# Patient Record
Sex: FEMALE | Race: WHITE | NOT HISPANIC OR LATINO | Employment: FULL TIME | URBAN - METROPOLITAN AREA
[De-identification: names, ages, dates, MRNs, and addresses within clinical notes are randomized per-mention and may not be internally consistent; named-entity substitution may affect disease eponyms.]

---

## 2023-09-11 PROBLEM — H40.001 PREGLAUCOMA, UNSPECIFIED, RIGHT EYE: Status: ACTIVE | Noted: 2023-09-11

## 2023-09-11 PROBLEM — E11.9 ABNORMAL METABOLIC STATE DUE TO DIABETES MELLITUS (MULTI): Status: ACTIVE | Noted: 2023-09-11

## 2023-09-11 PROBLEM — F41.9 ANXIETY AND DEPRESSION: Status: ACTIVE | Noted: 2023-09-11

## 2023-09-11 PROBLEM — H11.31 CONJUNCTIVAL HEMORRHAGE OF RIGHT EYE: Status: ACTIVE | Noted: 2023-09-11

## 2023-09-11 PROBLEM — E78.5 HYPERLIPIDEMIA: Status: ACTIVE | Noted: 2023-09-11

## 2023-09-11 PROBLEM — E11.9 DIABETES MELLITUS (MULTI): Status: ACTIVE | Noted: 2023-09-11

## 2023-09-11 PROBLEM — H43.399 VITREOUS OPACITIES: Status: ACTIVE | Noted: 2023-09-11

## 2023-09-11 PROBLEM — G47.00 INSOMNIA: Status: ACTIVE | Noted: 2023-09-11

## 2023-09-11 PROBLEM — I11.9 BENIGN HYPERTENSIVE HEART DISEASE: Status: ACTIVE | Noted: 2023-09-11

## 2023-09-11 PROBLEM — L40.9 PSORIASIS: Status: ACTIVE | Noted: 2023-09-11

## 2023-09-11 PROBLEM — I10 HYPERTENSION: Status: ACTIVE | Noted: 2023-09-11

## 2023-09-11 PROBLEM — E03.9 HYPOTHYROIDISM: Status: ACTIVE | Noted: 2023-09-11

## 2023-09-11 PROBLEM — H25.10 NUCLEAR SENILE CATARACT: Status: ACTIVE | Noted: 2023-09-11

## 2023-09-11 PROBLEM — H04.129 TEAR FILM INSUFFICIENCY: Status: ACTIVE | Noted: 2023-09-11

## 2023-09-11 PROBLEM — R93.1 ABNORMAL HEART SCORE CT: Status: ACTIVE | Noted: 2023-09-11

## 2023-09-11 PROBLEM — E11.9 TYPE 2 DIABETES MELLITUS WITHOUT COMPLICATION (MULTI): Status: ACTIVE | Noted: 2023-09-11

## 2023-09-11 PROBLEM — E66.01 MORBID OBESITY (MULTI): Status: ACTIVE | Noted: 2023-09-11

## 2023-09-11 PROBLEM — H35.039 HYPERTENSIVE RETINOPATHY: Status: ACTIVE | Noted: 2023-09-11

## 2023-09-11 PROBLEM — F32.A ANXIETY AND DEPRESSION: Status: ACTIVE | Noted: 2023-09-11

## 2023-09-11 PROBLEM — H44.20 DEGENERATIVE PROGRESSIVE HIGH MYOPIA: Status: ACTIVE | Noted: 2023-09-11

## 2023-09-11 RX ORDER — FENOFIBRATE 145 MG/1
1 TABLET, FILM COATED ORAL DAILY
COMMUNITY
Start: 2021-04-02

## 2023-09-11 RX ORDER — ROSUVASTATIN CALCIUM 10 MG/1
TABLET, COATED ORAL
COMMUNITY
Start: 2021-03-01

## 2023-09-11 RX ORDER — LEVOTHYROXINE SODIUM 112 UG/1
112 TABLET ORAL
COMMUNITY
Start: 2021-03-01

## 2023-09-11 RX ORDER — VENLAFAXINE HYDROCHLORIDE 75 MG/1
CAPSULE, EXTENDED RELEASE ORAL
COMMUNITY
End: 2023-12-14 | Stop reason: WASHOUT

## 2023-09-11 RX ORDER — VENLAFAXINE HYDROCHLORIDE 37.5 MG/1
CAPSULE, EXTENDED RELEASE ORAL
COMMUNITY
Start: 2021-03-01

## 2023-09-11 RX ORDER — TRAMADOL HYDROCHLORIDE 50 MG/1
TABLET ORAL
COMMUNITY
Start: 2020-08-31

## 2023-09-11 RX ORDER — CALCIUM CARBONATE 500(1250)
TABLET ORAL
COMMUNITY
End: 2023-12-14 | Stop reason: WASHOUT

## 2023-09-11 RX ORDER — AMLODIPINE BESYLATE 10 MG/1
1 TABLET ORAL DAILY
COMMUNITY
Start: 2021-06-25

## 2023-09-11 RX ORDER — ASPIRIN 81 MG/1
1 TABLET ORAL DAILY
COMMUNITY
Start: 2022-04-08 | End: 2024-01-25

## 2023-09-11 RX ORDER — CHOLECALCIFEROL (VITAMIN D3) 50 MCG
TABLET ORAL
COMMUNITY

## 2023-09-11 RX ORDER — TRAZODONE HYDROCHLORIDE 50 MG/1
TABLET ORAL
COMMUNITY
Start: 2021-08-04

## 2023-09-11 RX ORDER — DAPAGLIFLOZIN 10 MG/1
1 TABLET, FILM COATED ORAL DAILY
COMMUNITY
Start: 2021-07-20

## 2023-09-11 RX ORDER — METOPROLOL SUCCINATE 200 MG/1
1 TABLET, EXTENDED RELEASE ORAL DAILY
COMMUNITY
Start: 2021-03-01 | End: 2023-12-14 | Stop reason: WASHOUT

## 2023-09-11 RX ORDER — UBIDECARENONE 75 MG
1 CAPSULE ORAL DAILY
COMMUNITY
Start: 2022-04-08 | End: 2024-01-25

## 2023-09-11 RX ORDER — LOSARTAN POTASSIUM AND HYDROCHLOROTHIAZIDE 25; 100 MG/1; MG/1
1 TABLET ORAL DAILY
COMMUNITY
Start: 2021-08-30 | End: 2024-01-25 | Stop reason: DRUGHIGH

## 2023-09-11 RX ORDER — METFORMIN HYDROCHLORIDE 500 MG/1
2 TABLET, EXTENDED RELEASE ORAL 2 TIMES DAILY
COMMUNITY
Start: 2021-07-26

## 2023-09-11 RX ORDER — FENOFIBRATE 160 MG/1
TABLET ORAL
COMMUNITY
End: 2023-12-14 | Stop reason: WASHOUT

## 2023-09-11 RX ORDER — NAPROXEN SODIUM 220 MG/1
TABLET ORAL
COMMUNITY
End: 2023-12-14 | Stop reason: WASHOUT

## 2023-09-11 RX ORDER — LISINOPRIL 10 MG/1
TABLET ORAL
COMMUNITY
End: 2023-12-14 | Stop reason: WASHOUT

## 2023-09-11 RX ORDER — LIRAGLUTIDE 6 MG/ML
INJECTION SUBCUTANEOUS
COMMUNITY
Start: 2021-06-23 | End: 2023-12-14 | Stop reason: WASHOUT

## 2023-12-14 ENCOUNTER — OFFICE VISIT (OUTPATIENT)
Dept: RHEUMATOLOGY | Facility: CLINIC | Age: 63
End: 2023-12-14
Payer: MEDICAID

## 2023-12-14 ENCOUNTER — LAB (OUTPATIENT)
Dept: LAB | Facility: LAB | Age: 63
End: 2023-12-14
Payer: MEDICAID

## 2023-12-14 ENCOUNTER — ANCILLARY PROCEDURE (OUTPATIENT)
Dept: RADIOLOGY | Facility: CLINIC | Age: 63
End: 2023-12-14
Payer: MEDICAID

## 2023-12-14 VITALS
DIASTOLIC BLOOD PRESSURE: 72 MMHG | HEART RATE: 81 BPM | SYSTOLIC BLOOD PRESSURE: 126 MMHG | BODY MASS INDEX: 28.03 KG/M2 | OXYGEN SATURATION: 96 % | HEIGHT: 65 IN | WEIGHT: 168.21 LBS

## 2023-12-14 DIAGNOSIS — L40.0 PLAQUE PSORIASIS: ICD-10-CM

## 2023-12-14 DIAGNOSIS — R76.8 POSITIVE ANA (ANTINUCLEAR ANTIBODY): ICD-10-CM

## 2023-12-14 DIAGNOSIS — Z11.59 ENCOUNTER FOR SCREENING FOR OTHER VIRAL DISEASES: ICD-10-CM

## 2023-12-14 DIAGNOSIS — E55.9 VITAMIN D DEFICIENCY: ICD-10-CM

## 2023-12-14 DIAGNOSIS — M10.9 GOUT WITH MANIFESTATIONS: ICD-10-CM

## 2023-12-14 DIAGNOSIS — M05.80 POLYARTHRITIS WITH POSITIVE RHEUMATOID FACTOR (MULTI): ICD-10-CM

## 2023-12-14 DIAGNOSIS — M05.80 POLYARTHRITIS WITH POSITIVE RHEUMATOID FACTOR (MULTI): Primary | ICD-10-CM

## 2023-12-14 DIAGNOSIS — Z78.0 POST-MENOPAUSAL: ICD-10-CM

## 2023-12-14 LAB
ALBUMIN SERPL BCP-MCNC: 4.5 G/DL (ref 3.4–5)
ALP SERPL-CCNC: 49 U/L (ref 33–136)
ALT SERPL W P-5'-P-CCNC: 22 U/L (ref 7–45)
ANION GAP SERPL CALC-SCNC: 15 MMOL/L (ref 10–20)
APPEARANCE UR: CLEAR
AST SERPL W P-5'-P-CCNC: 26 U/L (ref 9–39)
BASOPHILS # BLD AUTO: 0.08 X10*3/UL (ref 0–0.1)
BASOPHILS NFR BLD AUTO: 0.6 %
BILIRUB SERPL-MCNC: 0.2 MG/DL (ref 0–1.2)
BILIRUB UR STRIP.AUTO-MCNC: NEGATIVE MG/DL
BUN SERPL-MCNC: 20 MG/DL (ref 6–23)
CALCIUM SERPL-MCNC: 10.1 MG/DL (ref 8.6–10.3)
CHLORIDE SERPL-SCNC: 97 MMOL/L (ref 98–107)
CK SERPL-CCNC: 49 U/L (ref 0–215)
CO2 SERPL-SCNC: 28 MMOL/L (ref 21–32)
COLOR UR: YELLOW
CREAT SERPL-MCNC: 0.71 MG/DL (ref 0.5–1.05)
CRP SERPL-MCNC: 2.02 MG/DL
EOSINOPHIL # BLD AUTO: 0.75 X10*3/UL (ref 0–0.7)
EOSINOPHIL NFR BLD AUTO: 5.4 %
ERYTHROCYTE [DISTWIDTH] IN BLOOD BY AUTOMATED COUNT: 14.3 % (ref 11.5–14.5)
ERYTHROCYTE [SEDIMENTATION RATE] IN BLOOD BY WESTERGREN METHOD: 30 MM/H (ref 0–30)
FERRITIN SERPL-MCNC: 41 NG/ML (ref 8–150)
GFR SERPL CREATININE-BSD FRML MDRD: >90 ML/MIN/1.73M*2
GLUCOSE SERPL-MCNC: 104 MG/DL (ref 74–99)
GLUCOSE UR STRIP.AUTO-MCNC: ABNORMAL MG/DL
HCT VFR BLD AUTO: 41.8 % (ref 36–46)
HGB BLD-MCNC: 12.8 G/DL (ref 12–16)
HOLD SPECIMEN: NORMAL
IMM GRANULOCYTES # BLD AUTO: 0.05 X10*3/UL (ref 0–0.7)
IMM GRANULOCYTES NFR BLD AUTO: 0.4 % (ref 0–0.9)
IRON SATN MFR SERPL: 8 % (ref 25–45)
IRON SERPL-MCNC: 40 UG/DL (ref 35–150)
KETONES UR STRIP.AUTO-MCNC: NEGATIVE MG/DL
LEUKOCYTE ESTERASE UR QL STRIP.AUTO: NEGATIVE
LYMPHOCYTES # BLD AUTO: 4.22 X10*3/UL (ref 1.2–4.8)
LYMPHOCYTES NFR BLD AUTO: 30.5 %
MCH RBC QN AUTO: 26.6 PG (ref 26–34)
MCHC RBC AUTO-ENTMCNC: 30.6 G/DL (ref 32–36)
MCV RBC AUTO: 87 FL (ref 80–100)
MONOCYTES # BLD AUTO: 0.98 X10*3/UL (ref 0.1–1)
MONOCYTES NFR BLD AUTO: 7.1 %
MUCOUS THREADS #/AREA URNS AUTO: NORMAL /LPF
NEUTROPHILS # BLD AUTO: 7.74 X10*3/UL (ref 1.2–7.7)
NEUTROPHILS NFR BLD AUTO: 56 %
NITRITE UR QL STRIP.AUTO: NEGATIVE
NRBC BLD-RTO: 0 /100 WBCS (ref 0–0)
PH UR STRIP.AUTO: 6 [PH]
PLATELET # BLD AUTO: 501 X10*3/UL (ref 150–450)
POTASSIUM SERPL-SCNC: 3.6 MMOL/L (ref 3.5–5.3)
PROT SERPL-MCNC: 8.3 G/DL (ref 6.4–8.2)
PROT UR STRIP.AUTO-MCNC: NEGATIVE MG/DL
RBC # BLD AUTO: 4.82 X10*6/UL (ref 4–5.2)
RBC # UR STRIP.AUTO: ABNORMAL /UL
RBC #/AREA URNS AUTO: NORMAL /HPF
SODIUM SERPL-SCNC: 136 MMOL/L (ref 136–145)
SP GR UR STRIP.AUTO: 1.02
TIBC SERPL-MCNC: 476 UG/DL (ref 240–445)
UIBC SERPL-MCNC: 436 UG/DL (ref 110–370)
URATE SERPL-MCNC: 5.5 MG/DL (ref 2.3–6.7)
UROBILINOGEN UR STRIP.AUTO-MCNC: <2 MG/DL
WBC # BLD AUTO: 13.8 X10*3/UL (ref 4.4–11.3)
WBC #/AREA URNS AUTO: NORMAL /HPF

## 2023-12-14 PROCEDURE — 85613 RUSSELL VIPER VENOM DILUTED: CPT

## 2023-12-14 PROCEDURE — 82550 ASSAY OF CK (CPK): CPT

## 2023-12-14 PROCEDURE — 83550 IRON BINDING TEST: CPT

## 2023-12-14 PROCEDURE — 73130 X-RAY EXAM OF HAND: CPT | Mod: BILATERAL PROCEDURE | Performed by: RADIOLOGY

## 2023-12-14 PROCEDURE — 86160 COMPLEMENT ANTIGEN: CPT

## 2023-12-14 PROCEDURE — 84550 ASSAY OF BLOOD/URIC ACID: CPT

## 2023-12-14 PROCEDURE — 86803 HEPATITIS C AB TEST: CPT

## 2023-12-14 PROCEDURE — 86140 C-REACTIVE PROTEIN: CPT

## 2023-12-14 PROCEDURE — 85730 THROMBOPLASTIN TIME PARTIAL: CPT

## 2023-12-14 PROCEDURE — 86146 BETA-2 GLYCOPROTEIN ANTIBODY: CPT

## 2023-12-14 PROCEDURE — 36415 COLL VENOUS BLD VENIPUNCTURE: CPT

## 2023-12-14 PROCEDURE — 86481 TB AG RESPONSE T-CELL SUSP: CPT

## 2023-12-14 PROCEDURE — 86225 DNA ANTIBODY NATIVE: CPT

## 2023-12-14 PROCEDURE — 83540 ASSAY OF IRON: CPT

## 2023-12-14 PROCEDURE — 85652 RBC SED RATE AUTOMATED: CPT

## 2023-12-14 PROCEDURE — 73630 X-RAY EXAM OF FOOT: CPT | Mod: 50

## 2023-12-14 PROCEDURE — 82306 VITAMIN D 25 HYDROXY: CPT

## 2023-12-14 PROCEDURE — 85025 COMPLETE CBC W/AUTO DIFF WBC: CPT

## 2023-12-14 PROCEDURE — 86376 MICROSOMAL ANTIBODY EACH: CPT

## 2023-12-14 PROCEDURE — 86618 LYME DISEASE ANTIBODY: CPT

## 2023-12-14 PROCEDURE — 3074F SYST BP LT 130 MM HG: CPT | Performed by: INTERNAL MEDICINE

## 2023-12-14 PROCEDURE — 80053 COMPREHEN METABOLIC PANEL: CPT

## 2023-12-14 PROCEDURE — 99205 OFFICE O/P NEW HI 60 MIN: CPT | Performed by: INTERNAL MEDICINE

## 2023-12-14 PROCEDURE — 1036F TOBACCO NON-USER: CPT | Performed by: INTERNAL MEDICINE

## 2023-12-14 PROCEDURE — 99215 OFFICE O/P EST HI 40 MIN: CPT | Performed by: INTERNAL MEDICINE

## 2023-12-14 PROCEDURE — 83516 IMMUNOASSAY NONANTIBODY: CPT

## 2023-12-14 PROCEDURE — 82652 VIT D 1 25-DIHYDROXY: CPT

## 2023-12-14 PROCEDURE — 87340 HEPATITIS B SURFACE AG IA: CPT

## 2023-12-14 PROCEDURE — 86200 CCP ANTIBODY: CPT

## 2023-12-14 PROCEDURE — 83655 ASSAY OF LEAD: CPT

## 2023-12-14 PROCEDURE — 82728 ASSAY OF FERRITIN: CPT

## 2023-12-14 PROCEDURE — 87389 HIV-1 AG W/HIV-1&-2 AB AG IA: CPT

## 2023-12-14 PROCEDURE — 73130 X-RAY EXAM OF HAND: CPT | Mod: 50

## 2023-12-14 PROCEDURE — 86147 CARDIOLIPIN ANTIBODY EA IG: CPT

## 2023-12-14 PROCEDURE — 82784 ASSAY IGA/IGD/IGG/IGM EACH: CPT

## 2023-12-14 PROCEDURE — 3078F DIAST BP <80 MM HG: CPT | Performed by: INTERNAL MEDICINE

## 2023-12-14 PROCEDURE — 73630 X-RAY EXAM OF FOOT: CPT | Mod: BILATERAL PROCEDURE | Performed by: RADIOLOGY

## 2023-12-14 PROCEDURE — 86036 ANCA SCREEN EACH ANTIBODY: CPT

## 2023-12-14 PROCEDURE — 81001 URINALYSIS AUTO W/SCOPE: CPT

## 2023-12-14 PROCEDURE — 81490 AUTOIMMUNE RA ALYS 12 BMRK: CPT

## 2023-12-14 RX ORDER — PREDNISONE 10 MG/1
20 TABLET ORAL DAILY
Qty: 60 TABLET | Refills: 1 | Status: SHIPPED | OUTPATIENT
Start: 2023-12-14 | End: 2024-01-13

## 2023-12-14 RX ORDER — OMEPRAZOLE 20 MG/1
20 TABLET, DELAYED RELEASE ORAL
COMMUNITY

## 2023-12-14 RX ORDER — FERROUS GLUCONATE 256(28)MG
28 TABLET ORAL
COMMUNITY

## 2023-12-14 RX ORDER — COLCHICINE 0.6 MG/1
0.6 TABLET ORAL 2 TIMES DAILY
Qty: 60 TABLET | Refills: 1 | Status: SHIPPED | OUTPATIENT
Start: 2023-12-14 | End: 2024-01-13

## 2023-12-14 RX ORDER — MULTIVIT-MIN/IRON FUM/FOLIC AC 7.5 MG-4
1 TABLET ORAL DAILY
COMMUNITY

## 2023-12-14 RX ORDER — DULAGLUTIDE 3 MG/.5ML
4.5 INJECTION, SOLUTION SUBCUTANEOUS
COMMUNITY

## 2023-12-14 ASSESSMENT — PATIENT HEALTH QUESTIONNAIRE - PHQ9
2. FEELING DOWN, DEPRESSED OR HOPELESS: SEVERAL DAYS
SUM OF ALL RESPONSES TO PHQ9 QUESTIONS 1 AND 2: 1
1. LITTLE INTEREST OR PLEASURE IN DOING THINGS: NOT AT ALL
10. IF YOU CHECKED OFF ANY PROBLEMS, HOW DIFFICULT HAVE THESE PROBLEMS MADE IT FOR YOU TO DO YOUR WORK, TAKE CARE OF THINGS AT HOME, OR GET ALONG WITH OTHER PEOPLE: NOT DIFFICULT AT ALL

## 2023-12-14 ASSESSMENT — ROUTINE ASSESSMENT OF PATIENT INDEX DATA (RAPID3)
PICK_CLOTHES_OFF_FLOOR: WITHOUT ANY DIFFICULTY
FEELINGS_ANXIETY_NERVOUS: WITH SOME DIFFICULTY
IN_OUT_TRANSPORT: WITH MUCH DIFFICULTY
ON A SCALE OF ONE TO TEN, HOW MUCH PAIN HAVE YOU HAD BECAUSE OF YOUR CONDITION OVER THE PAST WEEK?: 5
ON A SCALE OF ONE TO TEN, CONSIDERING ALL THE WAYS IN WHICH ILLNESS AND HEALTH CONDITIONS MAY AFFECT YOU AT THIS TIME, PLEASE INDICATE BELOW HOW YOU ARE DOING:: 2.5
ON A SCALE OF ONE TO TEN, HOW MUCH PAIN HAVE YOU HAD BECAUSE OF YOUR CONDITION OVER THE PAST WEEK?: 5
FEELINGS_DEPRESSION: WITHOUT ANY DIFFICULTY
GOOD_NIGHTS_SLEEP: WITH MUCH DIFFICULTY
LIFT_CUP_TO_MOUTH: WITH SOME DIFFICULTY
WALK_FLAT_GROUND: WITHOUT ANY DIFFICULTY
TURN_FAUCETS_OFF: WITH SOME DIFFICULTY
ON A SCALE OF ONE TO TEN, CONSIDERING ALL THE WAYS IN WHICH ILLNESS AND HEALTH CONDITIONS MAY AFFECT YOU AT THIS TIME, PLEASE INDICATE BELOW HOW YOU ARE DOING:: 2.5
IN_OUT_BED: WITH SOME DIFFICULTY
WALK_KILOMETERS: WITHOUT ANY DIFFICULTY
DRESS_YOURSELF: WITH SOME DIFFICULTY
WASH_DRY_BODY: WITH SOME DIFFICULTY

## 2023-12-14 ASSESSMENT — ENCOUNTER SYMPTOMS
OCCASIONAL FEELINGS OF UNSTEADINESS: 0
DEPRESSION: 1
LOSS OF SENSATION IN FEET: 0

## 2023-12-14 ASSESSMENT — PAIN SCALES - GENERAL: PAINLEVEL: 2

## 2023-12-14 ASSESSMENT — PAIN - FUNCTIONAL ASSESSMENT: PAIN_FUNCTIONAL_ASSESSMENT: 0-10

## 2023-12-14 NOTE — PROGRESS NOTES
[facility]/  Rheumatology  [address], [address]  [place] [address]  Phone: [phone]  Fax: [phone]    Rheumatology Initial Visit 12/14/23    Referred by: Daya ORTIZ-CNP for arthralgia with swelling, psoriasis  Chief Complaint   Patient presents with    New Patient Visit       Fouzia Sanchez is a 63 y.o. female here for NP eval.      HPI  Chief complaint: joint swelling and pain  Since Feb 1, 2023  Sudden onset  Intermittent course  Precipitating factors: slight aching  Associated symptoms: swelling, stiffness, pain  Better: heat  Worse: cold  Occupation:   [employer]   Fatigue  Hair loss  Swelling of feet only when in pain  Abd pain/GERD  AM stiffness, sometimes all day  Joint pain, swelling  Muscle aches and weakness- L leg  Rashes- Psoriasis  Seasonal allergies  Frequent infections as a childp- strep  Pregnancy loss in 1st trimester and 1 stillborn  Numbness/tingling tall finger R hand  Thyroid dz- Hashimoto's  Diabetes  Post menopause  Depression/ Anxiety  Sleep disturbance    Currently tenderness of bilateral shoulders, L elbow, R hip, R 2nd MCP  Swelling of L elbow, soy ankles, R 2nd MCP  AM stiffness sometimes all day, sometimes 2 hr or less.  Patient went to urgent care and dx with gout and tx with colchine. Rheumatology panel done and found to have + JONNY, + SSB, and + RF.  Psoriasis since lost her baby.       FHx: Mother- Crohn's, psoriasis  Brother- Psoriasis, on Humira    Previous Tx  Naproxen- somewhat helpful  Ibuprofen- somewhat helpful  Aspirin- very helpful but takes 4 tablets 4 times a dy for flares  Steroids- very helpful  Colchicine- very helpful  Clobetasol and other creams- no significant help  Betamethasone- some help    Health Maintenance:  DXA- in the past, about 13; told it looked good    Immunization History   Administered Date(s) Administered    Flu vaccine (IIV4), preservative free *Check age/dose* 11/08/2016, 02/20/2020     Influenza Whole 10/22/2018    Influenza, seasonal, injectable, preservative free 2020, 10/26/2021, 10/19/2022    Moderna SARS-CoV-2 Vaccination 2021, 2021    Pneumococcal polysaccharide vaccine, 23-valent, age 2 years and older (PNEUMOVAX 23) 10/26/2021    Tdap vaccine, age 7 year and older (BOOSTRIX) 2022      Malignancy Hx- none    Past Medical History:   Diagnosis Date    Anemia     Depression     Diabetes mellitus (CMS/HCC)     Hashimoto's disease     Hypertension     Psoriasis       Past Surgical History:   Procedure Laterality Date     SECTION, CLASSIC      OOPHORECTOMY Right     OVARIAN CYST REMOVAL Right     SHOULDER OPEN ROTATOR CUFF REPAIR Left       Current Outpatient Medications   Medication Sig Dispense Refill    amLODIPine (Norvasc) 10 mg tablet Take 1 tablet (10 mg) by mouth once daily.      aspirin 81 mg EC tablet Take 1 tablet (81 mg) by mouth once daily.      cholecalciferol (Vitamin D-3) 50 MCG (2000 UT) tablet 1 capsule Orally      cyanocobalamin (Vitamin B-12) 500 mcg tablet Take 1 tablet (500 mcg) by mouth once daily.      dapagliflozin propanediol (Farxiga) 10 mg Take 1 tablet (10 mg) by mouth once daily.      fenofibrate (Tricor) 145 mg tablet Take 1 tablet (145 mg) by mouth once daily.      levothyroxine (Synthroid) 112 mcg tablet 1 tablet on an empty stomach in the morning Orally Once a day      losartan-hydrochlorothiazide (Hyzaar) 100-25 mg tablet Take 1 tablet by mouth once daily.      metFORMIN  mg 24 hr tablet Take 2 tablets (1,000 mg) by mouth 2 times a day.      omeprazole OTC (PriLOSEC OTC) 20 mg EC tablet Take 1 tablet (20 mg) by mouth once daily in the morning. Take before meals. Do not crush, chew, or split.      rosuvastatin (Crestor) 10 mg tablet 1 tablet Orally Once a day      traMADol (Ultram) 50 mg tablet TK 1 T PO  BID PRN      traZODone (Desyrel) 50 mg tablet 1 tablet at bedtime Orally Once a day      venlafaxine XR (Effexor-XR) 37.5  "mg 24 hr capsule TAKE 1 CAPSULE BY MOUTH EVERY DAY WITH FOOD      dulaglutide (Trulicity) 3 mg/0.5 mL pen injector Inject under the skin 1 (one) time per week. Once a week on Tuesday      ferrous gluconate 256 mg (28 mg iron) tablet Take 1 tablet (28 mg) by mouth 1 (one) time per week. Takes one tab once a week      multivitamin with minerals tablet Take 1 tablet by mouth once daily.       No current facility-administered medications for this visit.      Allergies   Allergen Reactions    Acetaminophen-Codeine Unknown    Etomidate Other    Hydrocodone Nausea/vomiting    Lovastatin Unknown    Naproxen Other     Stated did not work for her wrist pain        Vitals:    12/14/23 0944   BP: 126/72   Pulse: 81   SpO2: 96%   Weight: 76.3 kg (168 lb 3.4 oz)   Height: 1.651 m (5' 5\")     Pain Assessment: 0-10  Pain Location: Finger (Comment which one)         Physical Exam  Alert, attentive, no acute distress  Anicteric, non injected sclerae, external ears and nose wnl  Able to take a deep breath and speak in full sentences  Abd soft  Warm, adequately perfused. No edema, clubbing or cyanosis  Normal skin turgor, no rashes or purpura.  TTP and mild to mod swelling over multiple joints   Adequate ROM of hands  Able to get up from sitting without significant difficulty  Adequate gait without assistive devices  CN2-12 grossly intact, adequate muscle tone and bulk for age and gender, Ox3  Normal affect, insight, and judgement  Component      Latest Ref Rng 1/31/2023   Anti-SM      AI <0.2    Anti-RNP      AI 0.3    Anti-SM/RNP      AI <0.2    Anti-SSA      AI <0.2    Anti-SSB      AI 2.1 !    Anti-SCL-70      AI <0.2    Anti-LEXI-1 IgG      AI <0.2    Anti-Chromatin      AI <0.2    Anti-Centromere      AI <0.2    ANTI-RIBOSOMAL P      AI <0.2    Anti-DNA (DS)      IU/mL 2.0    URIC ACID      2.3 - 6.7 mg/dL 6.5    Rheumatoid Factor      0 - 15 IU/mL 71 (H)    JONNY      NEGATIVE  POSITIVE !    JONNY Titer      <1:80  1:320    JONNY " Pattern HOMOGENEOUS    Sed Rate      0 - 30 mm/h 26       1. Polyarthritis with positive rheumatoid factor (CMS/HCC)  CBC and Auto Differential    Comprehensive Metabolic Panel    C-Reactive Protein    Sedimentation Rate    Uric Acid    Urinalysis with Reflex Microscopic and Culture    Anti-Cardiolipin Antibody (IgA, IgG, and IgM)    Beta-2 Glycoprotein Antibodies    Anti-DNA Antibody, Double-Stranded    C1Q Complement    C3 Complement    C4 Complement    Citrulline Antibody, IgG    Creatine Kinase    Thyroid Peroxidase (TPO) Antibody    [facility]; [facility]; 465321 - Miscellaneous Test    Vitamin D 1,25 Dihydroxy (for eval of hypercalcemia)    Vitamin D 25-Hydroxy,Total (for eval of Vitamin D levels)    [facility] Modified 2-Tier Testing, 1st Tier    Hepatitis C Antibody    Hepatitis B Surface Antigen    Ferritin    Iron and TIBC    Lupus Anticoag. With Interpretation[Cisse]    IgG Subclasses (1, 2, 3, and 4)    ANCA-Associated Vasculitis Profile (ANCA,MPO,PR3)    T-Spot TB    HIV 1/2 Antigen/Antibody Screen with Reflex to Confirmation    XR hand 3+ views bilateral    XR foot 3+ views bilateral    XR DEXA bone density axial skeleton w VFA    Lead, Venous    colchicine 0.6 mg tablet    predniSONE (Deltasone) 10 mg tablet      2. Plaque psoriasis        3. Positive JONNY (antinuclear antibody)  CBC and Auto Differential    Comprehensive Metabolic Panel    C-Reactive Protein    Sedimentation Rate    Uric Acid    Urinalysis with Reflex Microscopic and Culture    Anti-Cardiolipin Antibody (IgA, IgG, and IgM)    Beta-2 Glycoprotein Antibodies    Anti-DNA Antibody, Double-Stranded    C1Q Complement    C3 Complement    C4 Complement    Citrulline Antibody, IgG    Creatine Kinase    Thyroid Peroxidase (TPO) Antibody    [facility]; [facility]; 392738 - Miscellaneous Test    Vitamin D 1,25 Dihydroxy (for eval of hypercalcemia)    Vitamin D 25-Hydroxy,Total (for eval of Vitamin D levels)    Lyme AB Modified 2-Tier Testing, 1st Tier    Hepatitis  C Antibody    Hepatitis B Surface Antigen    Ferritin    Iron and TIBC    Lupus Anticoag. With Interpretation[Cisse]    IgG Subclasses (1, 2, 3, and 4)    ANCA-Associated Vasculitis Profile (ANCA,MPO,PR3)    T-Spot TB    HIV 1/2 Antigen/Antibody Screen with Reflex to Confirmation    XR hand 3+ views bilateral    XR foot 3+ views bilateral    XR DEXA bone density axial skeleton w VFA    Lead, Venous    colchicine 0.6 mg tablet    predniSONE (Deltasone) 10 mg tablet      4. Vitamin D deficiency  Vitamin D 1,25 Dihydroxy (for eval of hypercalcemia)    Vitamin D 25-Hydroxy,Total (for eval of Vitamin D levels)      5. Post-menopausal  XR DEXA bone density axial skeleton w VFA      6. Gout with manifestations  Uric Acid      7. Encounter for screening for other viral diseases  Lyme AB Modified 2-Tier Testing, 1st Tier    Hepatitis C Antibody    Hepatitis B Surface Antigen    HIV 1/2 Antigen/Antibody Screen with Reflex to Confirmation          Will need a steroid sparing option/DMARD.  Rheum workup  Further recommendations based on workup and reassessment.  For now supportive care NSAID/ glucocorticoid prn.      All questions answered.  Patient to follow up with primary care provider regarding all other medical issues not addressed today.     Mary Bee MD      Patient Care Team:  ANGEL Godinez-CNP as PCP - General  Mary Bee MD as Consulting Physician (Rheumatology)

## 2023-12-15 ENCOUNTER — HOSPITAL ENCOUNTER (OUTPATIENT)
Dept: RADIOLOGY | Facility: HOSPITAL | Age: 63
Discharge: HOME | End: 2023-12-15
Payer: MEDICAID

## 2023-12-15 LAB
25(OH)D3 SERPL-MCNC: 55 NG/ML (ref 30–100)
B2 GLYCOPROT1 IGA SER-ACNC: <0.6 U/ML
B2 GLYCOPROT1 IGG SER-ACNC: <1.4 U/ML
B2 GLYCOPROT1 IGM SER-ACNC: 0.4 U/ML
C3 SERPL-MCNC: 231 MG/DL (ref 87–200)
C4 SERPL-MCNC: 70 MG/DL (ref 10–50)
CARDIOLIPIN IGA SERPL-ACNC: <0.5 APL U/ML
CARDIOLIPIN IGG SER IA-ACNC: <1.6 GPL U/ML
CARDIOLIPIN IGM SER IA-ACNC: 0.4 MPL U/ML
CCP IGG SERPL-ACNC: >300 U/ML
DSDNA AB SER-ACNC: 2 IU/ML
HBV SURFACE AG SERPL QL IA: NONREACTIVE
HCV AB SER QL: NONREACTIVE
HIV 1+2 AB+HIV1 P24 AG SERPL QL IA: NONREACTIVE
IGG SERPL-MCNC: 1560 MG/DL (ref 700–1600)
IGG1 SER-MCNC: 935 MG/DL (ref 490–1140)
IGG2 SER-MCNC: 638 MG/DL (ref 150–640)
IGG3 SER-MCNC: 58 MG/DL (ref 11–85)
IGG4 SER-MCNC: 156 MG/DL (ref 3–200)
LEAD BLD-MCNC: <0.5 UG/DL
THYROPEROXIDASE AB SERPL-ACNC: >1000 IU/ML

## 2023-12-15 PROCEDURE — 77085 DXA BONE DENSITY AXL VRT FX: CPT

## 2023-12-16 LAB
1,25(OH)2D3 SERPL-MCNC: 51.8 PG/ML (ref 19.9–79.3)
NIL(NEG) CONTROL SPOT COUNT: NORMAL
PANEL A SPOT COUNT: 1
PANEL B SPOT COUNT: 0
POS CONTROL SPOT COUNT: NORMAL
T-SPOT. TB INTERPRETATION: NEGATIVE

## 2023-12-18 LAB
B BURGDOR.VLSE1+PEPC10 AB SER IA-ACNC: 0.29 IV
DRVVT SCREEN TO CONFIRM RATIO: 1.12 RATIO
DRVVT/DRVVT CFM NRMLZD PPP-RTO: 1.16 RATIO
DRVVT/DRVVT CFM P DOAC NEUT NORM PPP-RTO: 0.97 RATIO
LA 2 SCREEN W REFLEX-IMP: NORMAL
NORMALIZED SCT PPP-RTO: 0.84 RATIO
SILICA CLOTTING TIME CONFIRMATION: 1.06 RATIO
SILICA CLOTTING TIME SCREEN: 0.89 RATIO

## 2023-12-19 LAB
ANCA AB PATTERN SER IF-IMP: NORMAL
ANCA IGG TITR SER IF: NORMAL {TITER}
MYELOPEROXIDASE AB SER-ACNC: 0 AU/ML (ref 0–19)
PROTEINASE3 AB SER-ACNC: 0 AU/ML (ref 0–19)

## 2023-12-21 LAB — SCAN RESULT: NORMAL

## 2023-12-26 LAB — C1Q SERPL-MCNC: 12.4 MG/DL (ref 10.3–20.5)

## 2024-01-24 NOTE — PROGRESS NOTES
HPI   62 yo presents for evaluation of elevated calcium.  Pt with dm2, htn, dyslipidemia, hashimoto's disease, depression, psoriasis.      Taking metformin 500mg er (4), farxiga 10mg, trulicity 3mg weekly.    Taking crestor 10mg, fenofibrate 145mg, rx fish oil 4 grams daily for lipids and tolerating.    Taking losartan/hydrochlorothiazide 100/25mg, amlodipine 10mg, toprol xl 200mg for htn.    Taking levothyroxine 112mcg , euthyroid, no obstructive sx.    Calcium/parathyroid:  2024: pth-16, calcium-10.8, tsh-0.358 from pathology labs with pcp     Dec 2023 labs reveal a calcium of 10.1, 25-OH D-55, 1,25 D-wnl.  In the past was taking vitamin D 2,000-4,000 international units, not currently taking.  No calcium supplements, had taken biotin in the past. Has been on a thiazide for several years.    Pt had fractured metatarsal in her 20's, had a T11 compression fracture in her 50's after falling down the stairs.  No hx of kidney stones.    Dexa 12/15/2023: T score: L spine -0.4, L total femur + 2.2, L femoral neck -0.8    Past Medical History:   Diagnosis Date    Anemia      Depression      Diabetes mellitus (CMS/HCC)      Hashimoto's disease      Hypertension      Psoriasis           Surgical History[]Expand by Default         Past Surgical History:   Procedure Laterality Date     SECTION, CLASSIC        OOPHORECTOMY Right      OVARIAN CYST REMOVAL Right      SHOULDER OPEN ROTATOR CUFF REPAIR Left         FHx: Mother- Crohn's, psoriasis          Brother- Psoriasis, on Humira    SH-remote tobacco, occ Etoh, no illicit substances         Current Outpatient Medications:     amLODIPine (Norvasc) 10 mg tablet, Take 1 tablet (10 mg) by mouth once daily., Disp: , Rfl:     cholecalciferol (Vitamin D-3) 50 MCG (2000 UT) tablet, 1 capsule Orally, Disp: , Rfl:     colchicine 0.6 mg tablet, Take 1 tablet (0.6 mg) by mouth if needed for muscle/joint pain. As needed not more than two, Disp: , Rfl:     cyanocobalamin  (Vitamin B-12) 1,000 mcg tablet, Take 1 tablet (1,000 mcg) by mouth once daily., Disp: , Rfl:     dapagliflozin propanediol (Farxiga) 10 mg, Take 1 tablet (10 mg) by mouth once daily., Disp: , Rfl:     dulaglutide (Trulicity) 3 mg/0.5 mL pen injector, Inject under the skin 1 (one) time per week. Once a week on Tuesday, Disp: , Rfl:     fenofibrate (Tricor) 145 mg tablet, Take 1 tablet (145 mg) by mouth once daily., Disp: , Rfl:     ferrous gluconate 256 mg (28 mg iron) tablet, Take 1 tablet (28 mg) by mouth 1 (one) time per week. Takes one tab once a week, Disp: , Rfl:     icosapent ethyL (Vascepa) 1 gram capsule, Take 4 capsules (4 g) by mouth once daily., Disp: , Rfl:     levothyroxine (Synthroid) 112 mcg tablet, Take 1 tablet (112 mcg) by mouth once daily in the morning. Take before meals. One tablet Monday thru Saturday and one and a half on sunday, Disp: , Rfl:     losartan-hydrochlorothiazide (Hyzaar) 100-25 mg tablet, Take 1 tablet by mouth once daily., Disp: , Rfl:     metFORMIN  mg 24 hr tablet, Take 2 tablets (1,000 mg) by mouth 2 times a day., Disp: , Rfl:     multivitamin with minerals tablet, Take 1 tablet by mouth once daily., Disp: , Rfl:     omeprazole OTC (PriLOSEC OTC) 20 mg EC tablet, Take 1 tablet (20 mg) by mouth once daily in the morning. Take before meals. Do not crush, chew, or split., Disp: , Rfl:     rosuvastatin (Crestor) 10 mg tablet, 1 tablet Orally Once a day, Disp: , Rfl:     traMADol (Ultram) 50 mg tablet, TK 1 T PO  BID PRN, Disp: , Rfl:     traZODone (Desyrel) 50 mg tablet, 1 tablet at bedtime Orally Once a day, Disp: , Rfl:     venlafaxine XR (Effexor-XR) 37.5 mg 24 hr capsule, TAKE 1 CAPSULE BY MOUTH EVERY DAY WITH FOOD, Disp: , Rfl:       Allergies as of 01/25/2024 - Reviewed 01/25/2024   Allergen Reaction Noted    Acetaminophen-codeine Unknown 09/11/2023    Etomidate Other 09/11/2023    Hydrocodone Nausea/vomiting 09/11/2023    Percocet [oxycodone-acetaminophen] Unknown  12/14/2023    Lovastatin Unknown 09/11/2023         Review of Systems   Cardiology: Lightheadedness-denies.  Chest pain-denies.  Leg edema-denies.  Palpitations-denies.  Respiratory: Cough-denies. Shortness of breath-denies.  Wheezing-denies.  Gastroenterology: Constipation-denies.  Diarrhea-denies.  Heartburn-denies.  Endocrinology: Cold intolerance-denies.  Heat intolerance-denies.  Sweats-denies.  Neurology: Headache-denies.  Tremor-denies.  Neuropathy in extremities-denies.  Psychology: Low energy-denies.  Irritability-denies.  Sleep disturbances-denies.      /58 (BP Location: Left arm)   Pulse 83   Wt 75.6 kg (166 lb 9.6 oz)   BMI 27.72 kg/m²       Labs:  Lab Results   Component Value Date    WBC 13.8 (H) 12/14/2023    NRBC 0.0 12/14/2023    RBC 4.82 12/14/2023    HGB 12.8 12/14/2023    HCT 41.8 12/14/2023     (H) 12/14/2023     Lab Results   Component Value Date    CALCIUM 10.1 12/14/2023    AST 26 12/14/2023    ALKPHOS 49 12/14/2023    BILITOT 0.2 12/14/2023    PROT 8.3 (H) 12/14/2023    ALBUMIN 4.5 12/14/2023     12/14/2023    K 3.6 12/14/2023    CL 97 (L) 12/14/2023    CO2 28 12/14/2023    ANIONGAP 15 12/14/2023    BUN 20 12/14/2023    CREATININE 0.71 12/14/2023    GLUCOSE 104 (H) 12/14/2023    ALT 22 12/14/2023    EGFR >90 12/14/2023         Physical Exam   General Appearance: pleasant, cooperative, no acute distress  HEENT: no chemosis, no proptosis, no lid lag, no lid retraction  Neck: no lymphadenopathy, no thyromegaly, no dominant thyroid nodules  Heart: no murmurs, regular rate and rhythm, S1 and S2  Lungs: no wheezes, no rhonci, no rales  Extremities: no lower extremity swelling      Assessment/Plan   1. Hypercalcemia  -per history suspect calcium is mildly elevated due to being on hydrochlorothiazide with appropriately low pth in this setting    -plan to hold losartan 100mg/hydrochlorothiazide 25 and use plain losartan 100mg for now  -if bp elevates could add 20mg  lasix    -repeat pth/cmp/phos/vit d in 2 weeks,suspect levels will normalize with this    -further workup based on labs      Follow Up:  -based on labs    -labs/tests/notes reviewed  -reviewed and counseled patient on medication monitoring and side effects

## 2024-01-25 ENCOUNTER — OFFICE VISIT (OUTPATIENT)
Dept: ENDOCRINOLOGY | Facility: CLINIC | Age: 64
End: 2024-01-25
Payer: MEDICAID

## 2024-01-25 VITALS
DIASTOLIC BLOOD PRESSURE: 58 MMHG | HEART RATE: 83 BPM | WEIGHT: 166.6 LBS | SYSTOLIC BLOOD PRESSURE: 125 MMHG | BODY MASS INDEX: 27.72 KG/M2

## 2024-01-25 DIAGNOSIS — E83.52 HYPERCALCEMIA: Primary | ICD-10-CM

## 2024-01-25 PROCEDURE — 3074F SYST BP LT 130 MM HG: CPT | Performed by: INTERNAL MEDICINE

## 2024-01-25 PROCEDURE — 3078F DIAST BP <80 MM HG: CPT | Performed by: INTERNAL MEDICINE

## 2024-01-25 PROCEDURE — 99203 OFFICE O/P NEW LOW 30 MIN: CPT | Performed by: INTERNAL MEDICINE

## 2024-01-25 PROCEDURE — 1036F TOBACCO NON-USER: CPT | Performed by: INTERNAL MEDICINE

## 2024-01-25 PROCEDURE — 4010F ACE/ARB THERAPY RXD/TAKEN: CPT | Performed by: INTERNAL MEDICINE

## 2024-01-25 RX ORDER — LOSARTAN POTASSIUM 100 MG/1
100 TABLET ORAL DAILY
Qty: 90 TABLET | Refills: 3 | Status: SHIPPED | OUTPATIENT
Start: 2024-01-25 | End: 2025-01-24

## 2024-01-25 RX ORDER — LANOLIN ALCOHOL/MO/W.PET/CERES
1000 CREAM (GRAM) TOPICAL DAILY
COMMUNITY

## 2024-01-25 RX ORDER — COLCHICINE 0.6 MG/1
0.6 TABLET ORAL AS NEEDED
COMMUNITY

## 2024-01-25 RX ORDER — ICOSAPENT ETHYL 1 G/1
4 CAPSULE ORAL DAILY
COMMUNITY
Start: 2024-01-05 | End: 2024-06-07 | Stop reason: SDUPTHER

## 2024-01-25 ASSESSMENT — PATIENT HEALTH QUESTIONNAIRE - PHQ9
SUM OF ALL RESPONSES TO PHQ9 QUESTIONS 1 & 2: 0
1. LITTLE INTEREST OR PLEASURE IN DOING THINGS: NOT AT ALL
2. FEELING DOWN, DEPRESSED OR HOPELESS: NOT AT ALL

## 2024-01-25 ASSESSMENT — ENCOUNTER SYMPTOMS
DEPRESSION: 0
OCCASIONAL FEELINGS OF UNSTEADINESS: 0
LOSS OF SENSATION IN FEET: 0

## 2024-01-25 ASSESSMENT — PAIN SCALES - GENERAL: PAINLEVEL: 1

## 2024-02-08 ENCOUNTER — LAB (OUTPATIENT)
Dept: LAB | Facility: LAB | Age: 64
End: 2024-02-08
Payer: MEDICAID

## 2024-02-08 DIAGNOSIS — E83.52 HYPERCALCEMIA: ICD-10-CM

## 2024-02-08 LAB
ALBUMIN SERPL BCP-MCNC: 4.6 G/DL (ref 3.4–5)
ALP SERPL-CCNC: 73 U/L (ref 33–136)
ALT SERPL W P-5'-P-CCNC: 53 U/L (ref 7–45)
ANION GAP SERPL CALC-SCNC: 15 MMOL/L (ref 10–20)
AST SERPL W P-5'-P-CCNC: 43 U/L (ref 9–39)
BILIRUB SERPL-MCNC: 0.2 MG/DL (ref 0–1.2)
BUN SERPL-MCNC: 16 MG/DL (ref 6–23)
CALCIUM SERPL-MCNC: 10.5 MG/DL (ref 8.6–10.3)
CHLORIDE SERPL-SCNC: 98 MMOL/L (ref 98–107)
CO2 SERPL-SCNC: 28 MMOL/L (ref 21–32)
CREAT SERPL-MCNC: 0.67 MG/DL (ref 0.5–1.05)
EGFRCR SERPLBLD CKD-EPI 2021: >90 ML/MIN/1.73M*2
GLUCOSE SERPL-MCNC: 105 MG/DL (ref 74–99)
PHOSPHATE SERPL-MCNC: 4.8 MG/DL (ref 2.5–4.9)
POTASSIUM SERPL-SCNC: 4.7 MMOL/L (ref 3.5–5.3)
PROT SERPL-MCNC: 8 G/DL (ref 6.4–8.2)
SODIUM SERPL-SCNC: 136 MMOL/L (ref 136–145)

## 2024-02-08 PROCEDURE — 83970 ASSAY OF PARATHORMONE: CPT

## 2024-02-08 PROCEDURE — 80053 COMPREHEN METABOLIC PANEL: CPT

## 2024-02-08 PROCEDURE — 84100 ASSAY OF PHOSPHORUS: CPT

## 2024-02-08 PROCEDURE — 36415 COLL VENOUS BLD VENIPUNCTURE: CPT

## 2024-02-08 PROCEDURE — 82306 VITAMIN D 25 HYDROXY: CPT

## 2024-02-09 LAB
25(OH)D3 SERPL-MCNC: 56 NG/ML (ref 30–100)
PTH-INTACT SERPL-MCNC: 19.5 PG/ML (ref 18.5–88)

## 2024-02-12 DIAGNOSIS — E83.52 HYPERCALCEMIA: Primary | ICD-10-CM

## 2024-03-01 ENCOUNTER — OFFICE VISIT (OUTPATIENT)
Dept: RHEUMATOLOGY | Facility: CLINIC | Age: 64
End: 2024-03-01
Payer: MEDICAID

## 2024-03-01 ENCOUNTER — LAB (OUTPATIENT)
Dept: LAB | Facility: LAB | Age: 64
End: 2024-03-01
Payer: MEDICAID

## 2024-03-01 VITALS
OXYGEN SATURATION: 98 % | WEIGHT: 167.99 LBS | DIASTOLIC BLOOD PRESSURE: 84 MMHG | HEIGHT: 65 IN | SYSTOLIC BLOOD PRESSURE: 152 MMHG | HEART RATE: 85 BPM | BODY MASS INDEX: 27.99 KG/M2

## 2024-03-01 DIAGNOSIS — M05.79 SEROPOSITIVE RHEUMATOID ARTHRITIS OF MULTIPLE JOINTS (MULTI): Primary | ICD-10-CM

## 2024-03-01 DIAGNOSIS — M35.09 SJOGREN'S SYNDROME WITH OTHER ORGAN INVOLVEMENT (MULTI): ICD-10-CM

## 2024-03-01 DIAGNOSIS — M05.79 SEROPOSITIVE RHEUMATOID ARTHRITIS OF MULTIPLE JOINTS (MULTI): ICD-10-CM

## 2024-03-01 DIAGNOSIS — L40.0 PLAQUE PSORIASIS: ICD-10-CM

## 2024-03-01 DIAGNOSIS — E06.3 HASHIMOTO'S DISEASE: ICD-10-CM

## 2024-03-01 DIAGNOSIS — E61.1 IRON DEFICIENCY: ICD-10-CM

## 2024-03-01 DIAGNOSIS — E83.52 HYPERCALCEMIA: ICD-10-CM

## 2024-03-01 DIAGNOSIS — R76.8 POSITIVE ANA (ANTINUCLEAR ANTIBODY): ICD-10-CM

## 2024-03-01 LAB
ALBUMIN SERPL BCP-MCNC: 4.3 G/DL (ref 3.4–5)
ALP SERPL-CCNC: 67 U/L (ref 33–136)
ALT SERPL W P-5'-P-CCNC: 28 U/L (ref 7–45)
ANION GAP SERPL CALC-SCNC: 14 MMOL/L (ref 10–20)
AST SERPL W P-5'-P-CCNC: 28 U/L (ref 9–39)
BILIRUB SERPL-MCNC: 0.2 MG/DL (ref 0–1.2)
BUN SERPL-MCNC: 16 MG/DL (ref 6–23)
CALCIUM SERPL-MCNC: 10.2 MG/DL (ref 8.6–10.3)
CHLORIDE SERPL-SCNC: 99 MMOL/L (ref 98–107)
CO2 SERPL-SCNC: 27 MMOL/L (ref 21–32)
CREAT SERPL-MCNC: 0.65 MG/DL (ref 0.5–1.05)
EGFRCR SERPLBLD CKD-EPI 2021: >90 ML/MIN/1.73M*2
GLUCOSE SERPL-MCNC: 100 MG/DL (ref 74–99)
PHOSPHATE SERPL-MCNC: 4.2 MG/DL (ref 2.5–4.9)
POTASSIUM SERPL-SCNC: 4.3 MMOL/L (ref 3.5–5.3)
PROT SERPL-MCNC: 8.1 G/DL (ref 6.4–8.2)
SODIUM SERPL-SCNC: 136 MMOL/L (ref 136–145)
URATE SERPL-MCNC: 5.5 MG/DL (ref 2.3–6.7)

## 2024-03-01 PROCEDURE — 36415 COLL VENOUS BLD VENIPUNCTURE: CPT

## 2024-03-01 PROCEDURE — 1036F TOBACCO NON-USER: CPT | Performed by: INTERNAL MEDICINE

## 2024-03-01 PROCEDURE — 83970 ASSAY OF PARATHORMONE: CPT

## 2024-03-01 PROCEDURE — 82652 VIT D 1 25-DIHYDROXY: CPT

## 2024-03-01 PROCEDURE — 99215 OFFICE O/P EST HI 40 MIN: CPT | Performed by: INTERNAL MEDICINE

## 2024-03-01 PROCEDURE — 84155 ASSAY OF PROTEIN SERUM: CPT

## 2024-03-01 PROCEDURE — 84165 PROTEIN E-PHORESIS SERUM: CPT

## 2024-03-01 PROCEDURE — 84100 ASSAY OF PHOSPHORUS: CPT

## 2024-03-01 PROCEDURE — 84550 ASSAY OF BLOOD/URIC ACID: CPT

## 2024-03-01 PROCEDURE — 82397 CHEMILUMINESCENT ASSAY: CPT

## 2024-03-01 PROCEDURE — 84156 ASSAY OF PROTEIN URINE: CPT

## 2024-03-01 PROCEDURE — 3079F DIAST BP 80-89 MM HG: CPT | Performed by: INTERNAL MEDICINE

## 2024-03-01 PROCEDURE — 84166 PROTEIN E-PHORESIS/URINE/CSF: CPT | Performed by: INTERNAL MEDICINE

## 2024-03-01 PROCEDURE — 4010F ACE/ARB THERAPY RXD/TAKEN: CPT | Performed by: INTERNAL MEDICINE

## 2024-03-01 PROCEDURE — 84166 PROTEIN E-PHORESIS/URINE/CSF: CPT

## 2024-03-01 PROCEDURE — 80053 COMPREHEN METABOLIC PANEL: CPT

## 2024-03-01 PROCEDURE — 84165 PROTEIN E-PHORESIS SERUM: CPT | Performed by: INTERNAL MEDICINE

## 2024-03-01 PROCEDURE — 82164 ANGIOTENSIN I ENZYME TEST: CPT

## 2024-03-01 PROCEDURE — 82306 VITAMIN D 25 HYDROXY: CPT

## 2024-03-01 PROCEDURE — 3077F SYST BP >= 140 MM HG: CPT | Performed by: INTERNAL MEDICINE

## 2024-03-01 RX ORDER — LEUCOVORIN CALCIUM 5 MG/1
TABLET ORAL
Qty: 30 TABLET | Refills: 11 | Status: SHIPPED | OUTPATIENT
Start: 2024-03-01

## 2024-03-01 RX ORDER — HYDROXYCHLOROQUINE SULFATE 200 MG/1
400 TABLET, FILM COATED ORAL DAILY
Qty: 60 TABLET | Refills: 11 | Status: SHIPPED | OUTPATIENT
Start: 2024-03-01 | End: 2024-03-31

## 2024-03-01 RX ORDER — METOPROLOL SUCCINATE 200 MG/1
200 TABLET, EXTENDED RELEASE ORAL DAILY
COMMUNITY

## 2024-03-01 RX ORDER — METHOTREXATE 2.5 MG/1
12.5 TABLET ORAL
Qty: 20 TABLET | Refills: 5 | Status: SHIPPED | OUTPATIENT
Start: 2024-03-01 | End: 2024-03-31

## 2024-03-01 ASSESSMENT — ROUTINE ASSESSMENT OF PATIENT INDEX DATA (RAPID3)
DRESS_YOURSELF: WITHOUT ANY DIFFICULTY
GOOD_NIGHTS_SLEEP: WITH SOME DIFFICULTY
WASH_DRY_BODY: WITHOUT ANY DIFFICULTY
PICK_CLOTHES_OFF_FLOOR: WITHOUT ANY DIFFICULTY
ON A SCALE OF ONE TO TEN, HOW MUCH PAIN HAVE YOU HAD BECAUSE OF YOUR CONDITION OVER THE PAST WEEK?: 4
ON A SCALE OF ONE TO TEN, HOW MUCH PAIN HAVE YOU HAD BECAUSE OF YOUR CONDITION OVER THE PAST WEEK?: 4
TURN_FAUCETS_OFF: WITH MUCH DIFFICULTY
WALK_FLAT_GROUND: WITHOUT ANY DIFFICULTY
ON A SCALE OF ONE TO TEN, CONSIDERING ALL THE WAYS IN WHICH ILLNESS AND HEALTH CONDITIONS MAY AFFECT YOU AT THIS TIME, PLEASE INDICATE BELOW HOW YOU ARE DOING:: 2.5
IN_OUT_TRANSPORT: WITH SOME DIFFICULTY
ON A SCALE OF ONE TO TEN, CONSIDERING ALL THE WAYS IN WHICH ILLNESS AND HEALTH CONDITIONS MAY AFFECT YOU AT THIS TIME, PLEASE INDICATE BELOW HOW YOU ARE DOING:: 2.5
IN_OUT_BED: WITHOUT ANY DIFFICULTY
LIFT_CUP_TO_MOUTH: WITHOUT ANY DIFFICULTY
WALK_KILOMETERS: WITH SOME DIFFICULTY

## 2024-03-01 ASSESSMENT — ENCOUNTER SYMPTOMS
DEPRESSION: 0
OCCASIONAL FEELINGS OF UNSTEADINESS: 0
LOSS OF SENSATION IN FEET: 0

## 2024-03-01 ASSESSMENT — PATIENT HEALTH QUESTIONNAIRE - PHQ9
1. LITTLE INTEREST OR PLEASURE IN DOING THINGS: NOT AT ALL
SUM OF ALL RESPONSES TO PHQ9 QUESTIONS 1 AND 2: 0
2. FEELING DOWN, DEPRESSED OR HOPELESS: NOT AT ALL

## 2024-03-01 ASSESSMENT — PAIN SCALES - GENERAL: PAINLEVEL: 0-NO PAIN

## 2024-03-01 NOTE — PROGRESS NOTES
[facility]/  Rheumatology  [address] [place] [address], [address]  [place] [address]  Phone: [phone]  Fax: [phone]    Rheumatology Initial Visit 3/1/24        Fouzia Sanchez is a 63 y.o. female here for follow up.     Last Visit: 12/14/23    HPI  Chief complaint: joint swelling and pain  Since Feb 1, 2023  Sudden onset  Intermittent course  Precipitating factors: slight aching  Associated symptoms: swelling, stiffness, pain  Better: heat  Worse: cold  Occupation:   ROS+   Fatigue  Hair loss  Swelling of feet only when in pain  Abd pain/GERD  AM stiffness, sometimes all day  Joint pain, swelling  Muscle aches and weakness- L leg  Rashes- Psoriasis  Seasonal allergies  Frequent infections as a childp- strep  Pregnancy loss in 1st trimester and 1 stillborn  Numbness/tingling tall finger R hand  Thyroid dz- Hashimoto's  Diabetes  Post menopause  Depression/ Anxiety  Sleep disturbance    Currently tenderness of bilateral shoulders, L elbow, R hip, R 2nd MCP  Swelling of L elbow, soy ankles, R 2nd MCP  AM stiffness sometimes all day, sometimes 2 hr or less.  Patient went to urgent care and dx with gout and tx with colchine. Rheumatology panel done and found to have + JONNY, + SSB, and + RF.  Psoriasis since lost her baby.       FHx: Mother- Crohn's, psoriasis  Brother- Psoriasis, on Humira    Previous Tx  Naproxen- somewhat helpful  Ibuprofen- somewhat helpful  Aspirin- very helpful but takes 4 tablets 4 times a dy for flares  Steroids- very helpful  Colchicine- very helpful  Clobetasol and other creams- no significant help  Betamethasone- some help    Health Maintenance:  DXA- in the past, about 13; told it looked good    Immunization History   Administered Date(s) Administered    Flu vaccine (IIV4), preservative free *Check age/dose* 11/08/2016, 02/20/2020    Influenza Whole 10/22/2018    Influenza, seasonal, injectable, preservative free 08/31/2020, 10/26/2021, 10/19/2022     Moderna SARS-CoV-2 Vaccination 2021, 2021    Pneumococcal polysaccharide vaccine, 23-valent, age 2 years and older (PNEUMOVAX 23) 10/26/2021    Tdap vaccine, age 7 year and older (BOOSTRIX, ADACEL) 2022      Malignancy Hx- none    Past Medical History:   Diagnosis Date    Anemia     does not have    CAD (coronary artery disease)     Chronic tension headache     resolved 2020    COVID         Depression     Diabetes mellitus (CMS/HCC)     non insulin depndent    Hashimoto's disease     Hepatic steatosis     Hypercalcemia     resolved 2020    Hyperlipidemia     Hypertension     Hypertensive retinopathy     with preglaucoma- Dr Zhao    Iron deficiency anemia     does not have    Psoriasis     Thoracic compression fracture (CMS/HCC)     T11 from traumatic fall down stairs    Uterine fibroid       Past Surgical History:   Procedure Laterality Date     SECTION, CLASSIC      OOPHORECTOMY Right     OVARIAN CYST REMOVAL Right     SHOULDER OPEN ROTATOR CUFF REPAIR Left       Current Outpatient Medications   Medication Sig Dispense Refill    amLODIPine (Norvasc) 10 mg tablet Take 1 tablet (10 mg) by mouth once daily.      cholecalciferol (Vitamin D-3) 50 MCG (2000 UT) tablet 1 capsule Orally      colchicine 0.6 mg tablet Take 1 tablet (0.6 mg) by mouth if needed for muscle/joint pain. As needed not more than two      cyanocobalamin (Vitamin B-12) 1,000 mcg tablet Take 1 tablet (1,000 mcg) by mouth once daily.      dapagliflozin propanediol (Farxiga) 10 mg Take 1 tablet (10 mg) by mouth once daily.      dulaglutide (Trulicity) 3 mg/0.5 mL pen injector Inject 4.5 mg under the skin 1 (one) time per week. Once a week on Tuesday      fenofibrate (Tricor) 145 mg tablet Take 1 tablet (145 mg) by mouth once daily.      ferrous gluconate 256 mg (28 mg iron) tablet Take 1 tablet (28 mg) by mouth 1 (one) time per week. Takes one tab once a week      icosapent ethyL (Vascepa) 1 gram capsule  Take 4 capsules (4 g) by mouth once daily.      levothyroxine (Synthroid) 112 mcg tablet Take 1 tablet (112 mcg) by mouth once daily in the morning. Take before meals. One tablet Monday thru Saturday and one and a half on sunday      losartan (Cozaar) 100 mg tablet Take 1 tablet (100 mg) by mouth once daily. 90 tablet 3    metFORMIN  mg 24 hr tablet Take 2 tablets (1,000 mg) by mouth 2 times a day.      multivitamin with minerals tablet Take 1 tablet by mouth once daily.      omeprazole OTC (PriLOSEC OTC) 20 mg EC tablet Take 1 tablet (20 mg) by mouth once daily in the morning. Take before meals. Do not crush, chew, or split.      rosuvastatin (Crestor) 10 mg tablet 1 tablet Orally Once a day      traMADol (Ultram) 50 mg tablet TK 1 T PO  BID PRN      traZODone (Desyrel) 50 mg tablet 1 tablet at bedtime Orally Once a day      venlafaxine XR (Effexor-XR) 37.5 mg 24 hr capsule TAKE 1 CAPSULE BY MOUTH EVERY DAY WITH FOOD      hydroxychloroquine (Plaquenil) 200 mg tablet Take 2 tablets (400 mg) by mouth once daily. 60 tablet 11    leucovorin (Wellcovorin) 5 mg tablet Take with a full glass of water every day except the day of methotrexate. 30 tablet 11    methotrexate (Trexall) 2.5 mg tablet Take 5 tablets (12.5 mg total) by mouth 1 (one) time per week.  Follow directions carefully, and ask to explain any part you do not understand. Take exactly as directed. 20 tablet 5    metoprolol succinate XL (Toprol-XL) 200 mg 24 hr tablet Take 1 tablet (200 mg) by mouth once daily. Do not crush or chew.       No current facility-administered medications for this visit.      Allergies   Allergen Reactions    Acetaminophen-Codeine Unknown    Etomidate Other    Hydrocodone Nausea/vomiting    Percocet [Oxycodone-Acetaminophen] Unknown    Lovastatin Unknown        Vitals:    03/01/24 1258   BP: 152/84   BP Location: Left arm   Patient Position: Sitting   BP Cuff Size: Adult   Pulse: 85   SpO2: 98%   Weight: 76.2 kg (167 lb 15.9  "oz)   Height: 1.651 m (5' 5\")      Rapid 3     Pain Score (PN) (0-10): 4  Patient Global (PTGL) (0-10): 2.5             Component      Latest Ref [facility] 1/31/2023   Anti-SM      AI <0.2    Anti-RNP      AI 0.3    Anti-SM/RNP      AI <0.2    Anti-SSA      AI <0.2    Anti-SSB      AI 2.1 !    Anti-SCL-70      AI <0.2    Anti-LEXI-1 IgG      AI <0.2    Anti-Chromatin      AI <0.2    Anti-Centromere      AI <0.2    ANTI-RIBOSOMAL P      AI <0.2    Anti-DNA (DS)      IU/mL 2.0    URIC ACID      2.3 - 6.7 mg/dL 6.5    Rheumatoid Factor      0 - 15 IU/mL 71 (H)    JONNY      NEGATIVE  POSITIVE !    JONNY Titer      <1:80  1:320    JONNY Pattern HOMOGENEOUS    Sed Rate      0 - 30 mm/h 26       Component      Latest Ref [facility] 12/14/2023   WBC      4.4 - 11.3 x10*3/uL 13.8 (H)    nRBC      0.0 - 0.0 /100 WBCs 0.0    RBC      4.00 - 5.20 x10*6/uL 4.82    HEMOGLOBIN      12.0 - 16.0 g/dL 12.8    HEMATOCRIT      36.0 - 46.0 % 41.8    MCV      80 - 100 fL 87    MCH      26.0 - 34.0 pg 26.6    MCHC      32.0 - 36.0 g/dL 30.6 (L)    RED CELL DISTRIBUTION WIDTH      11.5 - 14.5 % 14.3    Platelets      150 - 450 x10*3/uL 501 (H)    Neutrophils %      40.0 - 80.0 % 56.0    Immature Granulocytes %, Automated      0.0 - 0.9 % 0.4    Lymphocytes %      13.0 - 44.0 % 30.5    Monocytes %      2.0 - 10.0 % 7.1    Eosinophils %      0.0 - 6.0 % 5.4    Basophils %      0.0 - 2.0 % 0.6    Neutrophils Absolute      1.20 - 7.70 x10*3/uL 7.74 (H)    Immature Granulocytes Absolute, Automated      0.00 - 0.70 x10*3/uL 0.05    Lymphocytes Absolute      1.20 - 4.80 x10*3/uL 4.22    Monocytes Absolute      0.10 - 1.00 x10*3/uL 0.98    Eosinophils Absolute      0.00 - 0.70 x10*3/uL 0.75 (H)    Basophils Absolute      0.00 - 0.10 x10*3/uL 0.08    GLUCOSE      74 - 99 mg/dL 104 (H)    SODIUM      136 - 145 mmol/L 136    POTASSIUM      3.5 - 5.3 mmol/L 3.6    CHLORIDE      98 - 107 mmol/L 97 (L)    Bicarbonate      21 - 32 mmol/L 28    Anion Gap      10 - 20 mmol/L " 15    Blood Urea Nitrogen      6 - 23 mg/dL 20    Creatinine      0.50 - 1.05 mg/dL 0.71    EGFR      >60 mL/min/1.73m*2 >90    Calcium      8.6 - 10.3 mg/dL 10.1    Albumin      3.4 - 5.0 g/dL 4.5    Alkaline Phosphatase      33 - 136 U/L 49    Total Protein      6.4 - 8.2 g/dL 8.3 (H)    AST      9 - 39 U/L 26    Bilirubin Total      0.0 - 1.2 mg/dL 0.2    ALT      7 - 45 U/L 22    Color, Urine      Straw, Yellow  Yellow    Appearance, Urine      Clear  Clear    Specific Gravity, Urine      1.005 - 1.035  1.017    pH, Urine      5.0, 5.5, 6.0, 6.5, 7.0, 7.5, 8.0  6.0    Protein, Urine      NEGATIVE mg/dL NEGATIVE    Glucose, Urine      NEGATIVE mg/dL >=500 (3+) !    Blood, Urine      NEGATIVE  SMALL (1+) !    Ketones, Urine      NEGATIVE mg/dL NEGATIVE    Bilirubin, Urine      NEGATIVE  NEGATIVE    Urobilinogen, Urine      <2.0 mg/dL <2.0    Nitrite, Urine      NEGATIVE  NEGATIVE    Leukocyte Esterase, Urine      NEGATIVE  NEGATIVE    DRVVT Screen      RATIO 1.12    DRVVT Confirmation      RATIO 1.16    DRVVT Test Ratio      <=1.20 RATIO 0.97    SCT Screen      RATIO 0.89    SCT Confirmation      RATIO 1.06    SCT Test Ratio      <=1.16 RATIO 0.84    Lupus Anticoagulant Interpretation No evidence of lupus anticoagulant    IgG 1      490 - 1,140 mg/dL 935    IgG 2      150 - 640 mg/dL 638    IgG 3      11 - 85 mg/dL 58    IgG 4      3 - 200 mg/dL 156    IgG      700 - 1,600 mg/dL 1,560    T-SPOT. TB Interpretation      Negative  Negative    Panel A Spot Count 1    Panel B Spot Count 0    NIL(NEG) Control Spot Count Passed    POS Control Spot Count Passed    IRON      35 - 150 ug/dL 40    UIBC      110 - 370 ug/dL 436 (H)    TIBC      240 - 445 ug/dL 476 (H)    % Saturation      25 - 45 % 8 (L)    Myeloperoxidase (MPO) Ab, IgG      0 - 19 AU/mL 0    Serine Proteinase 3 (PR3) Ab, IgG      0 - 19 AU/mL 0    ANCA IFA Titer      <1:20  <1:20    ANCA IFA Pattern      None Detected  None Detected    Anticardiolipin IgA       <20.0 APL U/mL <0.5    Anticardiolipin IgG      <20.0 GPL U/mL <1.6    Anticardiolipin IgM      <20.0 MPL U/mL 0.4    Beta-2 Glyco 1 IgG      <20.0 U/mL <1.4    Beta-2 Glyco 1 IgA      <20.0 U/mL <0.6    Beta 2 Glyco 1 IgM      <20.0 U/mL 0.4    WBC, Urine      1-5, NONE /HPF 1-5    RBC, Urine      NONE, 1-2, 3-5 /HPF 1-2    Mucus, Urine      Reference range not established. /LPF FEW    C-Reactive Protein      <1.00 mg/dL 2.02 (H)    Sed Rate      0 - 30 mm/h 30    URIC ACID      2.3 - 6.7 mg/dL 5.5    Anti-DNA (DS)      <5.0 IU/mL 2.0    C1Q Complement      10.3 - 20.5 mg/dL 12.4    C3 Complement      87 - 200 mg/dL 231 (H)    C4 Complement      10 - 50 mg/dL 70 (H)    Citrulline Antibody, IgG      <3 U/mL >300 (H)    Creatine Kinase      0 - 215 U/L 49    Thyroid Peroxidase (TPO) Antibody      <=60 IU/mL >1,000 (H)    Vectra Scan Result 57 (High)   Vit D, 1,25-Dihydroxy      19.9 - 79.3 pg/mL 51.8    Vitamin D, 25-Hydroxy, Total      30 - 100 ng/mL 55    B. burgdorferi VlsE1/pepC10 Abs, WILL      <=0.90 IV 0.29    Hepatitis C AB      Nonreactive  Nonreactive    Hepatitis B Surface AG      Nonreactive  Nonreactive    FERRITIN      8 - 150 ng/mL 41    HIV 1/2 Antigen/Antibody Screen with Reflex to Confirmation      Nonreactive  Nonreactive    Lead, Venous      <3.5 ug/dL <0.5    Extra Tube Hold for add-ons.         Interpreted By:  Chaim Weir,   STUDY:  DEXA BONE DENSITY AXIAL SKELETON W VFA12/15/2023 2:41 pm      INDICATION:  Signs/Symptoms:high risk. The patient is a 64 y/o  year old F.  Postmenopausal      COMPARISON:  None available      ACCESSION NUMBER(S):  [id number]      ORDERING CLINICIAN:  GABRIELE MCINTYRE      TECHNIQUE:  DEXA BONE DENSITY AXIAL SKELETON W VFA      FINDINGS:  SPINE L1-L4  Bone Mineral Density: 1.002 g/cm2  T-Score -0.4  Z-Score 1.2      LEFT FEMUR -TOTAL  Bone Mineral Density: 1.215 g/cm2  T-Score 2.2   Z-Score  3.3      LEFT FEMUR -NECK  Bone Mineral Density: 0.762  g/cm2  T-Score -0.8  Z-Score 0.6          World Health Organization (WHO) criteria for post-menopausal,   Women:  Normal:         T-score at or above -1 SD  Osteopenia:   T-score between -1 and -2.5 SD  Osteoporosis: T-score at or below -2.5 SD          10-year Fracture Risk(1):  Major Osteoporotic Fracture  7.3 %  Hip Fracture                        0.4 %      Note:  If no FRAX score is reported, it is because:  Some T-score for Spine Total or Hip Total or Femoral Neck at or below  -2.5      Vertebral Deformity Assessment: Exam Date - 12/15/2023 2:41 pm  -----------------------------------------------------------------  Within the limitations of the DEXA scan imaging, there is no gross  thoracolumbar compression deformity identified.          IMPRESSION:  DEXA:  According to World Health Organization criteria,  classification is normal.      Followup recommended in 2 years or sooner as clinically warranted.      VFA:  Within the limitations of the DEXA scan imaging, there is no  gross thoracolumbar compression deformity identified.      All images and detailed analysis are available on the  Radiology  PACS.      MACRO:  None      Signed by: Chaim Weir 12/15/2023 3:14 PM  Dictation workstation:   [id number]    Narrative & Impression   Interpreted By:  Marissa Nieto,   STUDY:  Bilateral hands, 3views each.      INDICATION:  Signs/Symptoms:pain; ?gout, psoriatic arthritis      COMPARISON:  None      ACCESSION NUMBER(S):  [id number]      ORDERING CLINICIAN:  GABRIELE MCINTYRE      FINDINGS:  Left hand:  No acute fracture or malalignment. Small ossific density with  surrounding lucency along the dorsal base of the 2nd distal phalanx  likely representing a fragmented osteophyte. Mild 2nd distal  interphalangeal joint degenerative changes with joint space loss and  osteophytes. No erosions. Soft tissues are within normal limits.      Right hand:  No acute fracture or malalignment.  Mild 2nd and  3rd distal interphalangeal joint degenerative changes  with joint space loss and dorsal osteophytes evident. Mild 1st CMC  joint degenerative changes as well no erosions. Soft tissues are  within normal limits.      IMPRESSION:  Left hand: Mild 2nd distal interphalangeal joint degenerative changes.  No erosions.      Right hand: Mild 2nd and 3rd DIP joint osteoarthrosis as well as mild  1st CMC joint osteoarthrosis. No erosions.      MACRO:  None.      Signed by: Marissa Nieto 12/16/2023 6:19 AM  Dictation workstation:   [id number]     Interpreted By:  Marissa Nieto,   STUDY:  Bilateral feet, 3 views each.      INDICATION:  Signs/Symptoms:arthritis.      COMPARISON:  None.      ACCESSION NUMBER(S):  [id number]      ORDERING CLINICIAN:  GABRIELE MCINTYRE      STUDY:  Left foot:  No acute fracture or malalignment.  No significant degenerative changes.  Small accessory navicular noted. Erosions.  Small Achilles insertional enthesophyte noted on the calcaneus. Os  peroneum. Soft tissues are within normal limits.      Right foot:  No acute fracture or malalignment.  No significant degenerative changes.  Small accessory navicular noted. Achilles insertional enthesophyte  noted on the calcaneus. Os peroneum noted. No erosions.  Soft tissues are within normal limits.      IMPRESSION:  No erosions or significant degenerative changes of the feet.  Bilateral Achilles insertional enthesophytes which can be associated  with chronic Achilles tendinopathy.      MACRO:  None.      Signed by: Marissa Nieto 12/16/2023 6:16 AM  Dictation workstation:   [id number]    1. Seropositive rheumatoid arthritis of multiple joints (CMS/HCC)  Uric Acid    hydroxychloroquine (Plaquenil) 200 mg tablet    leucovorin (Wellcovorin) 5 mg tablet    methotrexate (Trexall) 2.5 mg tablet    Plaquenil level; [facility]; 588000 - Miscellaneous Test    CBC and Auto Differential    C1Q Complement    C3 Complement    C4 Complement    Anti-DNA  "Antibody, Double-Stranded    Plaquenil level; [facility]; 630333 - Miscellaneous Test    Creatine Kinase    C-Reactive Protein    Creatinine, Urine Random    Albumin , Urine Random    Iron and TIBC    Ferritin    Vitamin D 25-Hydroxy,Total (for eval of Vitamin D levels)    Urinalysis with Reflex Culture and Microscopic    Vectra; [facility]; 384241 - Miscellaneous Test    Sedimentation Rate    Uric Acid    Methotrexate      2. Iron deficiency  Iron and TIBC    Ferritin      3. Hashimoto's disease        4. Hypercalcemia        5. Positive JONNY (antinuclear antibody)  C1Q Complement    C3 Complement    C4 Complement    Anti-DNA Antibody, Double-Stranded      6. Sjogren's syndrome with other organ involvement (CMS/HCC)        7. Plaque psoriasis          Since last appt, adherent and tolerating colchicine.  In the last 3 months, doing well with taking colchicine or glucocorticoid prn.  Has had episodes of hand swelling  L knee swelling and painthis past Tuesday. Took  mg x2, colchicine 0.6 mg, then still sore  next day took another colchicine and then another one 12 hr later.    Can stand without having to push off the chair and get into and out of the car which was hard to do before.   From December through now flares\"  Knee last week  3 L hand  1 R hand  1 R shoulder/rotator cuff and arm- took 5 days of medicine to stop the flare.   Only once took a prednisone for one of the hands flare.   Psoriasis active >10% BSA  Following with Endo for hypercalcemia. Deemed med related. Hydrochlorothiazide and biotin discontinued. Monitoring labs drawn today and in process.  Just completed antibiotics for strep infection (last one, when younger years ago).  Not on any NSAIDs.  ROS+ for fever with infection, drenching night sweats, dry mouth and sore throat, difficulty swallowing without choking, GI issue, joint pain, L hand and forearm, joint swelling, numbness/tingling hands.  Rapid 3 consistent with moderate severity.  Workup " reviewed including XR and d/w pt. Soft tissue swelling, periarticular osteopenia, chronic achilles enthesopathy, no erosions. Elevated WBC, PLT, CRP. High Vectra, anti CCP and Anti TPO.  D/w pt tx options and decided on HCQ and methotrexate.  Reassess uric acid.   Iron PO/diet and reassess. No bleeding.   Advised of possible side effects and importance of monitoring.   All questions answered.  Patient to follow up with primary care provider regarding all other medical issues not addressed today and for medical chart updating.     Mary Bee MD      Patient Care Team:  ANGEL Mayfield-CNP as PCP - General (Family Medicine)  Mary Bee MD as Consulting Physician (Rheumatology)

## 2024-03-01 NOTE — PATIENT INSTRUCTIONS
Check your blood pressure 2 hours after your morning meds. If it is more than 140/90, take furosemide (Lasix) 20 mg.  Check your blood pressure before evening blood pressure medicines.   If your blood pressure is less than 130/80, hold amlodipine and take metoprolol only.   If your blood pressure is less than 100/70- hold both blood pressure medicines.  Keep a log of your blood pressures and bring it to your doctor's appointment.    Start Plaquenil (hydroxychloroquine). Take 1 tablet twice daily with food. Discontinue if you get worse rash.    In 2 weeks, start methotrexate. Take 5 tablets together once a week after dinner.  Take leucovorin every day except the day of methotrexate.  If you get an infection, hold methotrexate and let us know.

## 2024-03-02 LAB
25(OH)D3 SERPL-MCNC: 51 NG/ML (ref 30–100)
PROT SERPL-MCNC: 8.1 G/DL (ref 6.4–8.2)
PROT UR-ACNC: 62 MG/DL (ref 5–25)
PTH-INTACT SERPL-MCNC: 19.5 PG/ML (ref 18.5–88)

## 2024-03-03 LAB — ACE SERPL-CCNC: 34 U/L (ref 16–85)

## 2024-03-04 LAB — 1,25(OH)2D3 SERPL-MCNC: 40.9 PG/ML (ref 19.9–79.3)

## 2024-03-06 LAB
ALBUMIN MFR UR ELPH: 79.7 %
ALBUMIN: 4.1 G/DL (ref 3.4–5)
ALPHA 1 GLOBULIN: 0.3 G/DL (ref 0.2–0.6)
ALPHA 2 GLOBULIN: 0.8 G/DL (ref 0.4–1.1)
ALPHA1 GLOB MFR UR ELPH: 3.4 %
ALPHA2 GLOB MFR UR ELPH: 4.4 %
B-GLOBULIN MFR UR ELPH: 6.8 %
BETA GLOBULIN: 1.2 G/DL (ref 0.5–1.2)
GAMMA GLOB MFR UR ELPH: 5.7 %
GAMMA GLOBULIN: 1.7 G/DL (ref 0.5–1.4)
PATH REVIEW-SERUM PROTEIN ELECTROPHORESIS: ABNORMAL
PATH REVIEW-URINE PROTEIN ELECTROPHORESIS: NORMAL
PROTEIN ELECTROPHORESIS COMMENT: ABNORMAL
URINE ELECTROPHORESIS COMMENT: NORMAL

## 2024-03-07 LAB — PTH RELATED PROT SERPL-SCNC: 0.4 PMOL/L

## 2024-03-11 DIAGNOSIS — E83.52 HYPERCALCEMIA: Primary | ICD-10-CM

## 2024-04-24 PROCEDURE — RXMED WILLOW AMBULATORY MEDICATION CHARGE

## 2024-04-25 ENCOUNTER — PHARMACY VISIT (OUTPATIENT)
Dept: PHARMACY | Facility: CLINIC | Age: 64
End: 2024-04-25
Payer: MEDICAID

## 2024-05-15 ENCOUNTER — PHARMACY VISIT (OUTPATIENT)
Dept: PHARMACY | Facility: CLINIC | Age: 64
End: 2024-05-15
Payer: MEDICAID

## 2024-05-15 PROCEDURE — RXMED WILLOW AMBULATORY MEDICATION CHARGE

## 2024-06-07 DIAGNOSIS — E78.5 HYPERLIPIDEMIA, UNSPECIFIED HYPERLIPIDEMIA TYPE: Primary | ICD-10-CM

## 2024-06-07 DIAGNOSIS — R93.1 ABNORMAL HEART SCORE CT: ICD-10-CM

## 2024-06-07 RX ORDER — ICOSAPENT ETHYL 1 G/1
4 CAPSULE ORAL DAILY
Qty: 360 CAPSULE | Refills: 3 | Status: SHIPPED | OUTPATIENT
Start: 2024-06-07 | End: 2025-06-07

## 2024-06-14 PROCEDURE — RXMED WILLOW AMBULATORY MEDICATION CHARGE

## 2024-06-15 ENCOUNTER — PHARMACY VISIT (OUTPATIENT)
Dept: PHARMACY | Facility: CLINIC | Age: 64
End: 2024-06-15
Payer: MEDICAID

## 2024-06-17 PROBLEM — M10.9 GOUT: Status: ACTIVE | Noted: 2023-12-14

## 2024-06-17 PROBLEM — E83.52 HYPERCALCEMIA: Status: ACTIVE | Noted: 2024-02-08

## 2024-06-17 PROBLEM — E55.9 VITAMIN D DEFICIENCY: Status: ACTIVE | Noted: 2023-12-14

## 2024-06-17 PROBLEM — R76.8 POSITIVE ANTINUCLEAR ANTIBODY: Status: ACTIVE | Noted: 2023-12-14

## 2024-06-17 PROBLEM — M13.0 POLYARTHROPATHY: Status: ACTIVE | Noted: 2023-12-14

## 2024-06-17 RX ORDER — METHOTREXATE 2.5 MG/1
TABLET ORAL
COMMUNITY
Start: 2024-04-21

## 2024-06-17 RX ORDER — CLOBETASOL PROPIONATE 0.5 MG/G
OINTMENT TOPICAL
COMMUNITY
Start: 2024-04-22

## 2024-06-17 RX ORDER — HYDROXYCHLOROQUINE SULFATE 200 MG/1
TABLET, FILM COATED ORAL
COMMUNITY
Start: 2024-06-04

## 2024-06-17 RX ORDER — DULAGLUTIDE 4.5 MG/.5ML
INJECTION, SOLUTION SUBCUTANEOUS
COMMUNITY
Start: 2024-03-04

## 2024-06-24 ENCOUNTER — LAB (OUTPATIENT)
Dept: LAB | Facility: LAB | Age: 64
End: 2024-06-24
Payer: MEDICAID

## 2024-06-24 DIAGNOSIS — R76.8 POSITIVE ANA (ANTINUCLEAR ANTIBODY): ICD-10-CM

## 2024-06-24 DIAGNOSIS — M05.79 SEROPOSITIVE RHEUMATOID ARTHRITIS OF MULTIPLE JOINTS (MULTI): ICD-10-CM

## 2024-06-24 DIAGNOSIS — E61.1 IRON DEFICIENCY: ICD-10-CM

## 2024-06-24 LAB
25(OH)D3 SERPL-MCNC: 52 NG/ML (ref 30–100)
AMORPH CRY #/AREA UR COMP ASSIST: NORMAL /HPF
APPEARANCE UR: CLEAR
BASOPHILS # BLD AUTO: 0.05 X10*3/UL (ref 0–0.1)
BASOPHILS NFR BLD AUTO: 0.7 %
BILIRUB UR STRIP.AUTO-MCNC: NEGATIVE MG/DL
C3 SERPL-MCNC: 179 MG/DL (ref 87–200)
C4 SERPL-MCNC: 52 MG/DL (ref 10–50)
CK SERPL-CCNC: 86 U/L (ref 0–215)
COLOR UR: ABNORMAL
CREAT UR-MCNC: 80.7 MG/DL (ref 20–320)
CRP SERPL-MCNC: 0.19 MG/DL
DSDNA AB SER-ACNC: 2 IU/ML
EOSINOPHIL # BLD AUTO: 0.25 X10*3/UL (ref 0–0.7)
EOSINOPHIL NFR BLD AUTO: 3.3 %
ERYTHROCYTE [DISTWIDTH] IN BLOOD BY AUTOMATED COUNT: 16.3 % (ref 11.5–14.5)
ERYTHROCYTE [SEDIMENTATION RATE] IN BLOOD BY WESTERGREN METHOD: 14 MM/H (ref 0–30)
FERRITIN SERPL-MCNC: 29 NG/ML (ref 8–150)
GLUCOSE UR STRIP.AUTO-MCNC: ABNORMAL MG/DL
HCT VFR BLD AUTO: 39.6 % (ref 36–46)
HGB BLD-MCNC: 12.2 G/DL (ref 12–16)
HOLD SPECIMEN: NORMAL
IMM GRANULOCYTES # BLD AUTO: 0.02 X10*3/UL (ref 0–0.7)
IMM GRANULOCYTES NFR BLD AUTO: 0.3 % (ref 0–0.9)
IRON SATN MFR SERPL: 22 % (ref 25–45)
IRON SERPL-MCNC: 96 UG/DL (ref 35–150)
KETONES UR STRIP.AUTO-MCNC: NEGATIVE MG/DL
LEUKOCYTE ESTERASE UR QL STRIP.AUTO: NEGATIVE
LYMPHOCYTES # BLD AUTO: 3.06 X10*3/UL (ref 1.2–4.8)
LYMPHOCYTES NFR BLD AUTO: 40.2 %
MCH RBC QN AUTO: 26.8 PG (ref 26–34)
MCHC RBC AUTO-ENTMCNC: 30.8 G/DL (ref 32–36)
MCV RBC AUTO: 87 FL (ref 80–100)
MICROALBUMIN UR-MCNC: 290.8 MG/L
MICROALBUMIN/CREAT UR: 360.3 UG/MG CREAT
MONOCYTES # BLD AUTO: 0.64 X10*3/UL (ref 0.1–1)
MONOCYTES NFR BLD AUTO: 8.4 %
MTX SERPL-SCNC: <0.04 UMOL/L
NEUTROPHILS # BLD AUTO: 3.6 X10*3/UL (ref 1.2–7.7)
NEUTROPHILS NFR BLD AUTO: 47.1 %
NITRITE UR QL STRIP.AUTO: NEGATIVE
NRBC BLD-RTO: 0 /100 WBCS (ref 0–0)
PH UR STRIP.AUTO: 7 [PH]
PLATELET # BLD AUTO: 396 X10*3/UL (ref 150–450)
PROT UR STRIP.AUTO-MCNC: ABNORMAL MG/DL
RBC # BLD AUTO: 4.55 X10*6/UL (ref 4–5.2)
RBC # UR STRIP.AUTO: NEGATIVE /UL
RBC #/AREA URNS AUTO: NORMAL /HPF
SP GR UR STRIP.AUTO: 1.02
TIBC SERPL-MCNC: 436 UG/DL (ref 240–445)
UIBC SERPL-MCNC: 340 UG/DL (ref 110–370)
URATE SERPL-MCNC: 5.9 MG/DL (ref 2.3–6.7)
UROBILINOGEN UR STRIP.AUTO-MCNC: NORMAL MG/DL
WBC # BLD AUTO: 7.6 X10*3/UL (ref 4.4–11.3)
WBC #/AREA URNS AUTO: NORMAL /HPF

## 2024-06-24 PROCEDURE — 82306 VITAMIN D 25 HYDROXY: CPT

## 2024-06-24 PROCEDURE — 86160 COMPLEMENT ANTIGEN: CPT

## 2024-06-24 PROCEDURE — 86140 C-REACTIVE PROTEIN: CPT

## 2024-06-24 PROCEDURE — 83540 ASSAY OF IRON: CPT

## 2024-06-24 PROCEDURE — 85652 RBC SED RATE AUTOMATED: CPT

## 2024-06-24 PROCEDURE — 82550 ASSAY OF CK (CPK): CPT

## 2024-06-24 PROCEDURE — 36415 COLL VENOUS BLD VENIPUNCTURE: CPT

## 2024-06-24 PROCEDURE — 81001 URINALYSIS AUTO W/SCOPE: CPT | Performed by: INTERNAL MEDICINE

## 2024-06-24 PROCEDURE — 85025 COMPLETE CBC W/AUTO DIFF WBC: CPT

## 2024-06-24 PROCEDURE — 84550 ASSAY OF BLOOD/URIC ACID: CPT

## 2024-06-24 PROCEDURE — 83550 IRON BINDING TEST: CPT

## 2024-06-24 PROCEDURE — 86225 DNA ANTIBODY NATIVE: CPT

## 2024-06-24 PROCEDURE — 82728 ASSAY OF FERRITIN: CPT

## 2024-06-24 PROCEDURE — 82570 ASSAY OF URINE CREATININE: CPT

## 2024-06-24 PROCEDURE — 82043 UR ALBUMIN QUANTITATIVE: CPT

## 2024-06-24 PROCEDURE — 80204 DRUG ASSAY METHOTREXATE: CPT

## 2024-07-02 ENCOUNTER — APPOINTMENT (OUTPATIENT)
Dept: RHEUMATOLOGY | Facility: CLINIC | Age: 64
End: 2024-07-02
Payer: MEDICAID

## 2024-07-02 LAB — C1Q SERPL-MCNC: 9.5 MG/DL (ref 10.3–20.5)

## 2024-07-03 LAB
SCAN RESULT: NORMAL
SCAN RESULT: NORMAL

## 2024-07-08 ENCOUNTER — CLINICAL SUPPORT (OUTPATIENT)
Dept: OPHTHALMOLOGY | Facility: CLINIC | Age: 64
End: 2024-07-08
Payer: MEDICAID

## 2024-07-08 ENCOUNTER — APPOINTMENT (OUTPATIENT)
Dept: OPHTHALMOLOGY | Facility: CLINIC | Age: 64
End: 2024-07-08

## 2024-07-08 ENCOUNTER — OFFICE VISIT (OUTPATIENT)
Dept: OPHTHALMOLOGY | Facility: CLINIC | Age: 64
End: 2024-07-08
Payer: MEDICAID

## 2024-07-08 DIAGNOSIS — E11.9 TYPE 2 DIABETES MELLITUS WITHOUT COMPLICATION, UNSPECIFIED WHETHER LONG TERM INSULIN USE (MULTI): ICD-10-CM

## 2024-07-08 DIAGNOSIS — H25.813 COMBINED FORMS OF AGE-RELATED CATARACT OF BOTH EYES: ICD-10-CM

## 2024-07-08 DIAGNOSIS — H40.001 PREGLAUCOMA, UNSPECIFIED, RIGHT EYE: Primary | ICD-10-CM

## 2024-07-08 DIAGNOSIS — H44.23 BILATERAL DEGENERATIVE PROGRESSIVE HIGH MYOPIA: ICD-10-CM

## 2024-07-08 DIAGNOSIS — H04.123 INSUFFICIENCY OF TEAR FILM OF BOTH EYES: ICD-10-CM

## 2024-07-08 DIAGNOSIS — H52.7 REFRACTIVE ERROR: ICD-10-CM

## 2024-07-08 PROBLEM — H35.039 HYPERTENSIVE RETINOPATHY: Status: RESOLVED | Noted: 2023-09-11 | Resolved: 2024-07-08

## 2024-07-08 PROBLEM — H11.31 CONJUNCTIVAL HEMORRHAGE OF RIGHT EYE: Status: RESOLVED | Noted: 2023-09-11 | Resolved: 2024-07-08

## 2024-07-08 PROCEDURE — 99214 OFFICE O/P EST MOD 30 MIN: CPT | Performed by: OPHTHALMOLOGY

## 2024-07-08 PROCEDURE — 92133 CPTRZD OPH DX IMG PST SGM ON: CPT | Performed by: OPHTHALMOLOGY

## 2024-07-08 PROCEDURE — 92083 EXTENDED VISUAL FIELD XM: CPT | Performed by: OPHTHALMOLOGY

## 2024-07-08 PROCEDURE — 92015 DETERMINE REFRACTIVE STATE: CPT | Performed by: OPHTHALMOLOGY

## 2024-07-08 RX ORDER — LISINOPRIL 10 MG/1
TABLET ORAL EVERY 24 HOURS
COMMUNITY

## 2024-07-08 ASSESSMENT — SLIT LAMP EXAM - LIDS
COMMENTS: 1+ DERMATOCHALASIS - UPPER LID, 1+ BLEPHARITIS
COMMENTS: 1+ DERMATOCHALASIS - UPPER LID, 1+ BLEPHARITIS

## 2024-07-08 ASSESSMENT — PACHYMETRY
OS_CT(UM): 595
OD_CT(UM): 592

## 2024-07-08 ASSESSMENT — REFRACTION_WEARINGRX
OS_ADD: +2.50
OS_SPHERE: -4.75
OS_CYLINDER: -1.75
OS_AXIS: 080
OD_CYLINDER: -2.25
OD_SPHERE: -4.25
OD_ADD: +2.50
OD_AXIS: 066

## 2024-07-08 ASSESSMENT — ENCOUNTER SYMPTOMS
GASTROINTESTINAL NEGATIVE: 0
CONSTITUTIONAL NEGATIVE: 0
HEMATOLOGIC/LYMPHATIC NEGATIVE: 0
EYES NEGATIVE: 0
RESPIRATORY NEGATIVE: 0
ALLERGIC/IMMUNOLOGIC NEGATIVE: 0
MUSCULOSKELETAL NEGATIVE: 0
CARDIOVASCULAR NEGATIVE: 0
NEUROLOGICAL NEGATIVE: 0
PSYCHIATRIC NEGATIVE: 0
ENDOCRINE NEGATIVE: 0

## 2024-07-08 ASSESSMENT — VISUAL ACUITY
METHOD: SNELLEN - LINEAR
OS_CC: 20/30
OD_CC: 20/30
CORRECTION_TYPE: GLASSES

## 2024-07-08 ASSESSMENT — CUP TO DISC RATIO
OS_RATIO: 0.35
OD_RATIO: 0.4

## 2024-07-08 ASSESSMENT — EXTERNAL EXAM - RIGHT EYE: OD_EXAM: BROW PTOSIS

## 2024-07-08 ASSESSMENT — KERATOMETRY
OS_K2POWER_DIOPTERS: 46.00
OD_K2POWER_DIOPTERS: 45.00
OD_K1POWER_DIOPTERS: 43.75
OS_AXISANGLE2_DEGREES: 100
OD_AXISANGLE_DEGREES: 155
OD_AXISANGLE2_DEGREES: 65
OS_K1POWER_DIOPTERS: 44.25
OS_AXISANGLE_DEGREES: 10

## 2024-07-08 ASSESSMENT — EXTERNAL EXAM - LEFT EYE: OS_EXAM: BROW PTOSIS

## 2024-07-08 ASSESSMENT — TONOMETRY
IOP_METHOD: GOLDMANN APPLANATION
OS_IOP_MMHG: 18
OD_IOP_MMHG: 18

## 2024-07-08 ASSESSMENT — PAIN SCALES - GENERAL: PAINLEVEL: 0-NO PAIN

## 2024-07-08 NOTE — PROGRESS NOTES
Subjective   Patient ID: Fouzia Sanchez is a 64 y.o. female.    Chief Complaint    Annual Exam; FOREIGN BODY SENSATION        HPI    Complete, diabetic dilated, and preglaucoma exam.  No new changes in health history but several new meds.  FBS and dry eye sxs, especially OS.  Uses art tears only as needed.    Last edited by Chuckie Zhao MD on 7/8/2024 11:27 AM.        Current Outpatient Medications (Ophthalmology pharm classes)   Medication Sig Dispense Refill    lubricating eye drops ophthalmic solution 1 drop if needed for dry eyes.       Current Outpatient Medications (Other)   Medication Sig Dispense Refill    amLODIPine (Norvasc) 10 mg tablet Take 1 tablet (10 mg) by mouth once daily.      B complex-vitamin C-folic acid (Nephro-Jael Rx) 1- mg-mg-mcg tablet Take 1 tablet by mouth once daily with breakfast.      cholecalciferol (Vitamin D-3) 50 MCG (2000 UT) tablet 1 capsule Orally      clobetasol (Temovate) 0.05 % ointment APPLY TOPICALLY TO THE AFFECTED AREA TWICE DAILY      colchicine 0.6 mg tablet Take 1 tablet (0.6 mg) by mouth if needed for muscle/joint pain. As needed not more than two      cyanocobalamin (Vitamin B-12) 1,000 mcg tablet Take 1 tablet (1,000 mcg) by mouth once daily.      dapagliflozin propanediol (Farxiga) 10 mg Take 1 tablet (10 mg) by mouth once daily.      dulaglutide (Trulicity) 0.75 mg/0.5 mL pen injector Inject 0.75 mg under the skin every 7 days. 2 mL 2    dulaglutide (Trulicity) 0.75 mg/0.5 mL pen injector Inject 0.75 mg into skin once a week 2 mL 2    dulaglutide (Trulicity) 3 mg/0.5 mL pen injector Inject 4.5 mg under the skin 1 (one) time per week. Once a week on Tuesday      fenofibrate (Tricor) 145 mg tablet Take 1 tablet (145 mg) by mouth once daily.      ferrous gluconate 256 mg (28 mg iron) tablet Take 1 tablet (28 mg) by mouth 1 (one) time per week. Takes one tab once a week      hydroxychloroquine (Plaquenil) 200 mg tablet       icosapent ethyL (Vascepa) 1 gram  capsule Take 4 capsules (4 g) by mouth once daily. 360 capsule 3    leucovorin (Wellcovorin) 5 mg tablet Take with a full glass of water every day except the day of methotrexate. 30 tablet 11    levothyroxine (Synthroid) 112 mcg tablet Take 1 tablet (112 mcg) by mouth once daily in the morning. Take before meals. One tablet Monday thru Saturday and one and a half on sunday      lisinopril 10 mg tablet Take by mouth once every 24 hours.      losartan (Cozaar) 100 mg tablet Take 1 tablet (100 mg) by mouth once daily. 90 tablet 3    metFORMIN  mg 24 hr tablet Take 2 tablets (1,000 mg) by mouth 2 times a day.      methotrexate (Trexall) 2.5 mg tablet TAKE 5 TABLET BY MOUTH ONE TIME PER WEEK      metoprolol succinate XL (Toprol-XL) 200 mg 24 hr tablet Take 1 tablet (200 mg) by mouth once daily. Do not crush or chew.      multivitamin with minerals tablet Take 1 tablet by mouth once daily.      omeprazole OTC (PriLOSEC OTC) 20 mg EC tablet Take 1 tablet (20 mg) by mouth once daily in the morning. Take before meals. Do not crush, chew, or split.      rosuvastatin (Crestor) 10 mg tablet 1 tablet Orally Once a day      traMADol (Ultram) 50 mg tablet TK 1 T PO  BID PRN      traZODone (Desyrel) 50 mg tablet 1 tablet at bedtime Orally Once a day      Trulicity 4.5 mg/0.5 mL pen injector USE AS DIRECTED SUBCUTAENOUS ONCE A WEEK FOR 30 DAYS      venlafaxine XR (Effexor-XR) 37.5 mg 24 hr capsule TAKE 1 CAPSULE BY MOUTH EVERY DAY WITH FOOD         Objective   Base Eye Exam       Visual Acuity (Snellen - Linear)         Right Left Both    Dist cc 20/30 20/30     Near cc   J1      Correction: Glasses              Tonometry (Goldmann Applanation, 10:28 AM)         Right Left    Pressure 18 18              Pupils         Dark Shape React APD    Right 4 Round 1 None    Left 4 Round 1 None              Extraocular Movement         Right Left     Full Full              Dilation       Both eyes: 1% Tropic 2.5% Phen @ 10:28 AM                   Additional Tests       Keratometry         K1 Axis K2 Axis    Right 43.75 65 45.00 155    Left 44.25 100 46.00 10                  Slit Lamp and Fundus Exam       External Exam         Right Left    External Brow ptosis Brow ptosis              Slit Lamp Exam         Right Left    Lids/Lashes 1+ Dermatochalasis - upper lid, 1+ Blepharitis 1+ Dermatochalasis - upper lid, 1+ Blepharitis    Conjunctiva/Sclera normal bulbar and palepbral conjunctiva normal bulbar and palepbral conjunctiva    Cornea normal epi/stroma/endo and tear film normal epi/stroma/endo and tear film    Anterior Chamber normal anterior chamber, deep and quiet normal anterior chamber, deep and quiet    Iris iris normal iris normal    Lens Trace Nuclear sclerosis, Trace Cortical cataract Trace Nuclear sclerosis, Trace Cortical cataract    Anterior Vitreous vitreous syneresis vitreous syneresis              Fundus Exam         Right Left    Disc normal optic nerve normal optic nerve    C/D Ratio 0.4 0.35    Macula No background diabetic retinopathy No background diabetic retinopathy    Vessels normal vessels normal vessels    Periphery normal retinal periphery normal retinal periphery                  Refraction       Wearing Rx         Sphere Cylinder Axis Add    Right -4.25 -2.25 066 +2.50    Left -4.75 -1.75 080 +2.50              Final Rx         Sphere Cylinder [place] Dist VA Add Near VA    Right -3.75 -2.25 070 20/25 +2.75 20/20    Left -4.75 -1.75 080 20/25 +2.75 20/20      Expiration Date: 7/8/2026    Pupillary Distance: 59                    Assessment/Plan   Problem List Items Addressed This Visit          Eye/Vision problems    Degenerative progressive high myopia    Combined forms of age-related cataract of both eyes    Preglaucoma, unspecified, right eye - Primary     F/u one year full with oct nerves and hvf 30-2.           Relevant Orders    OCT, Optic Nerve - OU - Both Eyes (Completed)    Barton Visual Field - OU - Both Eyes  (Completed)    Tear film insufficiency    Type 2 diabetes mellitus without complication (Multi)    Refractive error

## 2024-07-08 NOTE — LETTER
July 8, 2024    DINO Mayfield     Patient: Fouzia Sanchez   YOB: 1960   Date of Visit: 7/8/2024       Dear DINO Burger:    Thank you for referring Fouzia Sanchez to me for evaluation. Here is my assessment and plan of care:    Assessment/Plan:  Fouzia was seen today for annual exam and foreign body sensation .  Diagnoses and all orders for this visit:  Preglaucoma, unspecified, right eye (Primary)  -     OCT, Optic Nerve - OU - Both Eyes  -     Barton Visual Field - OU - Both Eyes  Type 2 diabetes mellitus without complication, unspecified whether long term insulin use (Multi)  Combined forms of age-related cataract of both eyes  Bilateral degenerative progressive high myopia  Insufficiency of tear film of both eyes  Refractive error    Below you will find my full exam findings. If you have questions, please do not hesitate to call me. I look forward to following Fouzia along with you.     No signs of background diabetic retinopathy (BDR) OU and normal Barton visual field (HVF) test.  Follow up in one year.      Sincerely,        Chuckie Zhao MD        CC:   MD Mary Pina MD        Base Eye Exam       Visual Acuity (Snellen - Linear)         Right Left Both    Dist cc 20/30 20/30     Near cc   J1      Correction: Glasses              Tonometry (Goldmann Applanation, 10:28 AM)         Right Left    Pressure 18 18              Pupils         Dark Shape React APD    Right 4 Round 1 None    Left 4 Round 1 None              Extraocular Movement         Right Left     Full Full              Dilation       Both eyes: 1% Tropic 2.5% Phen @ 10:28 AM                  Additional Tests       Keratometry         K1 Axis K2 Axis    Right 43.75 65 45.00 155    Left 44.25 100 46.00 10                  Slit Lamp and Fundus Exam       External Exam         Right Left    External Brow ptosis Brow ptosis              Slit Lamp Exam         Right Left     Lids/Lashes 1+ Dermatochalasis - upper lid, 1+ Blepharitis 1+ Dermatochalasis - upper lid, 1+ Blepharitis    Conjunctiva/Sclera normal bulbar and palepbral conjunctiva normal bulbar and palepbral conjunctiva    Cornea normal epi/stroma/endo and tear film normal epi/stroma/endo and tear film    Anterior Chamber normal anterior chamber, deep and quiet normal anterior chamber, deep and quiet    Iris iris normal iris normal    Lens Trace Nuclear sclerosis, Trace Cortical cataract Trace Nuclear sclerosis, Trace Cortical cataract    Anterior Vitreous vitreous syneresis vitreous syneresis              Fundus Exam         Right Left    Disc normal optic nerve normal optic nerve    C/D Ratio 0.4 0.35    Macula No background diabetic retinopathy No background diabetic retinopathy    Vessels normal vessels normal vessels    Periphery normal retinal periphery normal retinal periphery                  Refraction       Wearing Rx         Sphere Cylinder Axis Add    Right -4.25 -2.25 066 +2.50    Left -4.75 -1.75 080 +2.50              Final Rx         Sphere Cylinder [place] Dist VA Add Near VA    Right -3.75 -2.25 070 20/25 +2.75 20/20    Left -4.75 -1.75 080 20/25 +2.75 20/20      Expiration Date: 7/8/2026    Pupillary Distance: 59

## 2024-07-11 PROCEDURE — RXMED WILLOW AMBULATORY MEDICATION CHARGE

## 2024-07-12 ENCOUNTER — APPOINTMENT (OUTPATIENT)
Dept: RHEUMATOLOGY | Facility: CLINIC | Age: 64
End: 2024-07-12
Payer: MEDICAID

## 2024-07-12 VITALS
OXYGEN SATURATION: 99 % | HEIGHT: 65 IN | WEIGHT: 168 LBS | HEART RATE: 83 BPM | SYSTOLIC BLOOD PRESSURE: 152 MMHG | DIASTOLIC BLOOD PRESSURE: 66 MMHG | BODY MASS INDEX: 27.99 KG/M2

## 2024-07-12 DIAGNOSIS — E61.1 IRON DEFICIENCY: ICD-10-CM

## 2024-07-12 DIAGNOSIS — M05.79 SEROPOSITIVE RHEUMATOID ARTHRITIS OF MULTIPLE JOINTS (MULTI): Primary | ICD-10-CM

## 2024-07-12 DIAGNOSIS — L40.0 PLAQUE PSORIASIS: ICD-10-CM

## 2024-07-12 DIAGNOSIS — E06.3 HASHIMOTO'S DISEASE: ICD-10-CM

## 2024-07-12 DIAGNOSIS — M10.9 GOUT WITH MANIFESTATIONS: ICD-10-CM

## 2024-07-12 DIAGNOSIS — Z11.59 ENCOUNTER FOR SCREENING FOR OTHER VIRAL DISEASES: ICD-10-CM

## 2024-07-12 DIAGNOSIS — M35.09 SJOGREN'S SYNDROME WITH OTHER ORGAN INVOLVEMENT (MULTI): ICD-10-CM

## 2024-07-12 DIAGNOSIS — E55.9 VITAMIN D DEFICIENCY: ICD-10-CM

## 2024-07-12 DIAGNOSIS — R76.8 POSITIVE ANA (ANTINUCLEAR ANTIBODY): ICD-10-CM

## 2024-07-12 PROCEDURE — 3077F SYST BP >= 140 MM HG: CPT | Performed by: INTERNAL MEDICINE

## 2024-07-12 PROCEDURE — 99214 OFFICE O/P EST MOD 30 MIN: CPT | Performed by: INTERNAL MEDICINE

## 2024-07-12 PROCEDURE — 3078F DIAST BP <80 MM HG: CPT | Performed by: INTERNAL MEDICINE

## 2024-07-12 PROCEDURE — 4010F ACE/ARB THERAPY RXD/TAKEN: CPT | Performed by: INTERNAL MEDICINE

## 2024-07-12 PROCEDURE — 3060F POS MICROALBUMINURIA REV: CPT | Performed by: INTERNAL MEDICINE

## 2024-07-12 RX ORDER — DICLOFENAC SODIUM 10 MG/G
4 GEL TOPICAL 4 TIMES DAILY
Qty: 100 G | Refills: 3 | Status: SHIPPED | OUTPATIENT
Start: 2024-07-12

## 2024-07-12 NOTE — PROGRESS NOTES
[facility]/  Rheumatology  [facility], [address]  [place] [address]  Phone: [phone]  Fax: [phone]    Rheumatology Follow Up Visit 7/12/24      Fouzia Sanchez is a 64 y.o. female here for follow up.     Last Visit: 3/1/24    HPI    Workup including XR and d/w pt. Soft tissue swelling, periarticular osteopenia, chronic achilles enthesopathy, no erosions. Elevated WBC, PLT, CRP. High Vectra, anti CCP and Anti TPO.  Chief complaint: joint swelling and pain  Since Feb 1, 2023  Sudden onset  Intermittent course  Precipitating factors: slight aching  Associated symptoms: swelling, stiffness, pain  Better: heat  Worse: cold  Occupation:   ROS+   Fatigue  Hair loss  Swelling of feet only when in pain  Abd pain/GERD  AM stiffness, sometimes all day  Joint pain, swelling  Muscle aches and weakness- L leg  Rashes- Psoriasis  Seasonal allergies  Frequent infections as a childp- strep  Pregnancy loss in 1st trimester and 1 stillborn  Numbness/tingling tall finger R hand  Thyroid dz- Hashimoto's  Diabetes  Post menopause  Depression/ Anxiety  Sleep disturbance    Currently tenderness of bilateral shoulders, L elbow, R hip, R 2nd MCP  Swelling of L elbow, soy ankles, R 2nd MCP  AM stiffness sometimes all day, sometimes 2 hr or less.  Patient went to urgent care and dx with gout and tx with colchine. Rheumatology panel done and found to have + JONNY, + SSB, and + RF.  Psoriasis since lost her baby.       FHx: Mother- Crohn's, psoriasis  Brother- Psoriasis, on Humira    Previous Tx  Naproxen- somewhat helpful  Ibuprofen- somewhat helpful  Aspirin- very helpful but takes 4 tablets 4 times a dy for flares  Steroids- very helpful  Colchicine- very helpful  Clobetasol and other creams- no significant help  Betamethasone- some help    Health Maintenance:  DXA- T-0.8 (hip; 12/23); FRAX 7.3%/0.4%  Malignancy Hx- none  Immunization History   Administered Date(s) Administered    Flu  vaccine (IIV4), preservative free *Check age/dose* 2016, 2020    Flu vaccine, quadrivalent, no egg protein, age 6 month or greater (FLUCELVAX) 2023    Influenza Whole 10/22/2018    Influenza, seasonal, injectable, preservative free 2020, 10/26/2021, 10/19/2022    Moderna SARS-CoV-2 Vaccination 2021, 2021    Pneumococcal polysaccharide vaccine, 23-valent, age 2 years and older (PNEUMOVAX 23) 10/26/2021    Tdap vaccine, age 7 year and older (BOOSTRIX, ADACEL) 2022       Past Medical History:   Diagnosis Date    Anemia     does not have    CAD (coronary artery disease)     Chronic tension headache     resolved 2020    COVID         Depression     Diabetes mellitus (Multi)     non insulin depndent    Hashimoto's disease     Hepatic steatosis     Hypercalcemia     resolved 2020    Hyperlipidemia     Hypertension     Hypertensive retinopathy     with preglaucoma- Dr Zhao    Iron deficiency anemia     does not have    Psoriasis     Thoracic compression fracture (Multi)     T11 from traumatic fall down stairs    Uterine fibroid       Past Surgical History:   Procedure Laterality Date     SECTION, CLASSIC      OOPHORECTOMY Right     OVARIAN CYST REMOVAL Right     SHOULDER OPEN ROTATOR CUFF REPAIR Left       Current Outpatient Medications   Medication Sig Dispense Refill    amLODIPine (Norvasc) 10 mg tablet Take 1 tablet (10 mg) by mouth once daily.      B complex-vitamin C-folic acid (Nephro-Jael Rx) 1- mg-mg-mcg tablet Take 1 tablet by mouth once daily with breakfast.      cholecalciferol (Vitamin D-3) 50 MCG ( UT) tablet 1 capsule Orally      clobetasol (Temovate) 0.05 % ointment APPLY TOPICALLY TO THE AFFECTED AREA TWICE DAILY      colchicine 0.6 mg tablet Take 1 tablet (0.6 mg) by mouth if needed for muscle/joint pain. As needed not more than two      cyanocobalamin (Vitamin B-12) 1,000 mcg tablet Take 1 tablet (1,000 mcg) by mouth once  daily.      dapagliflozin propanediol (Farxiga) 10 mg Take 1 tablet (10 mg) by mouth once daily.      dulaglutide (Trulicity) 0.75 mg/0.5 mL pen injector Inject 0.75 mg into skin once a week 2 mL 2    dulaglutide (Trulicity) 0.75 mg/0.5 mL pen injector Inject 0.75 mg under the skin every 7 days. 2 mL 2    dulaglutide (Trulicity) 3 mg/0.5 mL pen injector Inject 4.5 mg under the skin 1 (one) time per week. Once a week on Tuesday      fenofibrate (Tricor) 145 mg tablet Take 1 tablet (145 mg) by mouth once daily.      ferrous gluconate 256 mg (28 mg iron) tablet Take 1 tablet (28 mg) by mouth 1 (one) time per week. Takes one tab once a week      hydroxychloroquine (Plaquenil) 200 mg tablet       icosapent ethyL (Vascepa) 1 gram capsule Take 4 capsules (4 g) by mouth once daily. 360 capsule 3    leucovorin (Wellcovorin) 5 mg tablet Take with a full glass of water every day except the day of methotrexate. 30 tablet 11    levothyroxine (Synthroid) 112 mcg tablet Take 1 tablet (112 mcg) by mouth once daily in the morning. Take before meals. One tablet Monday thru Saturday and one and a half on sunday      lisinopril 10 mg tablet Take by mouth once every 24 hours.      losartan (Cozaar) 100 mg tablet Take 1 tablet (100 mg) by mouth once daily. 90 tablet 3    lubricating eye drops ophthalmic solution 1 drop if needed for dry eyes.      metFORMIN  mg 24 hr tablet Take 2 tablets (1,000 mg) by mouth 2 times a day.      methotrexate (Trexall) 2.5 mg tablet TAKE 5 TABLET BY MOUTH ONE TIME PER WEEK      metoprolol succinate XL (Toprol-XL) 200 mg 24 hr tablet Take 1 tablet (200 mg) by mouth once daily. Do not crush or chew.      multivitamin with minerals tablet Take 1 tablet by mouth once daily.      omeprazole OTC (PriLOSEC OTC) 20 mg EC tablet Take 1 tablet (20 mg) by mouth once daily in the morning. Take before meals. Do not crush, chew, or split.      rosuvastatin (Crestor) 10 mg tablet 1 tablet Orally Once a day       "traMADol (Ultram) 50 mg tablet TK 1 T PO  BID PRN      traZODone (Desyrel) 50 mg tablet 1 tablet at bedtime Orally Once a day      Trulicity 4.5 mg/0.5 mL pen injector USE AS DIRECTED SUBCUTAENOUS ONCE A WEEK FOR 30 DAYS      venlafaxine XR (Effexor-XR) 37.5 mg 24 hr capsule TAKE 1 CAPSULE BY MOUTH EVERY DAY WITH FOOD      diclofenac sodium (Voltaren) 1 % gel Apply 4.5 inches (4 g) topically 4 times a day. 100 g 3     No current facility-administered medications for this visit.      Allergies   Allergen Reactions    Acetaminophen-Codeine Unknown    Etomidate Other and Unknown    Hydrocodone Nausea/vomiting and Unknown    Hydrocodone-Acetaminophen Nausea/vomiting    Percocet [Oxycodone-Acetaminophen] Unknown    Lovastatin Unknown    Naproxen Other and Unknown     Stated did not work for her wrist pain      Visit Vitals  /66   Pulse 83   Ht 1.651 m (5' 5\")   Wt 76.2 kg (168 lb)   SpO2 99%   BMI 27.96 kg/m²   Smoking Status Former   BSA 1.87 m²         Rapid 3                 Workup     Component      Latest Ref Rng 6/24/2024   LEUKOCYTES (10*3/UL) IN BLOOD BY AUTOMATED COUNT, [place]      4.4 - 11.3 x10*3/uL 7.6    nRBC      0.0 - 0.0 /100 WBCs 0.0    ERYTHROCYTES (10*6/UL) IN BLOOD BY AUTOMATED COUNT, [place]      4.00 - 5.20 x10*6/uL 4.55    HEMOGLOBIN      12.0 - 16.0 g/dL 12.2    HEMATOCRIT      36.0 - 46.0 % 39.6    MCV      80 - 100 fL 87    MCH      26.0 - 34.0 pg 26.8    MCHC      32.0 - 36.0 g/dL 30.8 (L)    RED CELL DISTRIBUTION WIDTH      11.5 - 14.5 % 16.3 (H)    PLATELETS (10*3/UL) IN BLOOD AUTOMATED COUNT, [place]      150 - 450 x10*3/uL 396    NEUTROPHILS/100 LEUKOCYTES IN BLOOD BY AUTOMATED COUNT, [place]      40.0 - 80.0 % 47.1    Immature Granulocytes %, Automated      0.0 - 0.9 % 0.3    Lymphocytes %      13.0 - 44.0 % 40.2    Monocytes %      2.0 - 10.0 % 8.4    Eosinophils %      0.0 - 6.0 % 3.3    Basophils %      0.0 - 2.0 % 0.7    NEUTROPHILS (10*3/UL) IN BLOOD BY AUTOMATED COUNT, " [place]      1.20 - 7.70 x10*3/uL 3.60    Immature Granulocytes Absolute, Automated      0.00 - 0.70 x10*3/uL 0.02    Lymphocytes Absolute      1.20 - 4.80 x10*3/uL 3.06    Monocytes Absolute      0.10 - 1.00 x10*3/uL 0.64    Eosinophils Absolute      0.00 - 0.70 x10*3/uL 0.25    Basophils Absolute      0.00 - 0.10 x10*3/uL 0.05    Color, Urine      Light-Yellow, Yellow, Dark-Yellow  Light-Yellow    Appearance, Urine      Clear  Clear    Specific Gravity, Urine      1.005 - 1.035  1.016    pH, Urine      5.0, 5.5, 6.0, 6.5, 7.0, 7.5, 8.0  7.0    Protein, Urine      NEGATIVE, 10 (TRACE), 20 (TRACE) mg/dL 30 (1+) !    Glucose, Urine      Normal mg/dL OVER (4+) !    Blood, Urine      NEGATIVE  NEGATIVE    Ketones, Urine      NEGATIVE mg/dL NEGATIVE    Bilirubin, Urine      NEGATIVE  NEGATIVE    Urobilinogen, Urine      Normal mg/dL Normal    Nitrite, Urine      NEGATIVE  NEGATIVE    Leukocyte Esterase, Urine      NEGATIVE  NEGATIVE    IRON      35 - 150 ug/dL 96    UIBC      110 - 370 ug/dL 340    TIBC      240 - 445 ug/dL 436    % Saturation      25 - 45 % 22 (L)    Albumin, Urine Random      Not established mg/L 290.8    Creatinine, Urine Random      20.0 - 320.0 mg/dL 80.7    Albumin/Creatinine Ratio      <30.0 ug/mg Creat 360.3 (H)    WBC, Urine      1-5, NONE /HPF 1-5    RBC, Urine      NONE, 1-2, 3-5 /HPF 1-2    Amorphous Crystals, Urine      NONE, 1+, 2+ /HPF 1+    C1Q Complement      10.3 - 20.5 mg/dL 9.5 (L)    C3 Complement      87 - 200 mg/dL 179    C4 Complement      10 - 50 mg/dL 52 (H)    Anti-DNA (DS)      <5.0 IU/mL 2.0    Scan Result HCQ 1385 (A>500)   Scan Result Vectra 34 (moderate)    Creatine Kinase      0 - 215 U/L 86    C-Reactive Protein      <1.00 mg/dL 0.19    FERRITIN      8 - 150 ng/mL 29    Vitamin D, 25-Hydroxy, Total      30 - 100 ng/mL 52    Sed Rate      0 - 30 mm/h 14    URIC ACID      2.3 - 6.7 mg/dL 5.9    Methotrexate      umol/L <0.04    Extra Tube Hold for add-ons.        1.  Seropositive rheumatoid arthritis of multiple joints (Multi)  diclofenac sodium (Voltaren) 1 % gel    Albumin-Creatinine Ratio, Urine Random    Anti-DNA Antibody, Double-Stranded    C1Q Complement    C3 Complement    C4 Complement    CBC and Auto Differential    Comprehensive Metabolic Panel    C-Reactive Protein    Creatine Kinase    Creatinine, Urine Random    Sedimentation Rate    Urinalysis with Reflex Culture and Microscopic    Vectra; [facility]; 101818 - Miscellaneous Test    Vitamin D 25-Hydroxy,Total (for eval of Vitamin D levels)    Interleukin-6      2. Sjogren's syndrome with other organ involvement (Multi)        3. Plaque psoriasis        4. Positive JONNY (antinuclear antibody)  Albumin-Creatinine Ratio, Urine Random    Anti-DNA Antibody, Double-Stranded    C1Q Complement    C3 Complement    C4 Complement    CBC and Auto Differential    Comprehensive Metabolic Panel    C-Reactive Protein    Creatine Kinase    Creatinine, Urine Random    Sedimentation Rate    Urinalysis with Reflex Culture and Microscopic    Vitamin D 25-Hydroxy,Total (for eval of Vitamin D levels)      5. Hashimoto's disease  Thyroid Stimulating Hormone    Thyroxine, Free      6. Iron deficiency  Iron and TIBC    Interleukin-6    Ferritin      7. Vitamin D deficiency  Vitamin D 25-Hydroxy,Total (for eval of Vitamin D levels)      8. Gout with manifestations  Uric Acid      9. Encounter for screening for other viral diseases              Since last appt, adherent and tolerating  mg daily, colchicine 0.6 mg daily  Rare NSAIDs  Last colchicine Feb  Going well re arthritis  Psoriasis worse- manageable- low amount daily sun exposure helps, tolerates  Chronic R 3rd finger numbness  L clicking finger on L hand- just started about last 4 months  AM stiffness not prolonged  Denies any recent or current infection.  Not on any NSAIDs or glucocorticoids.  ROS+ for scalp tenderness, abd pain/diarrhea,- non bloody and intermittent/variable,-not  deemed due to methotrexate per pt, joint pain, L hand stiffness, numbness/tingling hands and feet, lower back pain  Rapid 3 consistent with low severity  Labs reviewed  D/w pt tx options   Continue current regimen  Advised of possible side effects and importance of monitoring.   All questions answered.  Patient to follow up with primary care provider regarding all other medical issues not addressed today and for medical chart updating.     Mary Bee MD      Patient Care Team:  ANGEL Mayfield-CNP as PCP - General (Family Medicine)  Mary Bee MD as Consulting Physician (Rheumatology)

## 2024-07-16 ENCOUNTER — HOSPITAL ENCOUNTER (EMERGENCY)
Facility: HOSPITAL | Age: 64
Discharge: HOME | End: 2024-07-16
Payer: MEDICAID

## 2024-07-16 ENCOUNTER — PHARMACY VISIT (OUTPATIENT)
Dept: PHARMACY | Facility: CLINIC | Age: 64
End: 2024-07-16
Payer: MEDICAID

## 2024-07-16 ENCOUNTER — APPOINTMENT (OUTPATIENT)
Dept: RADIOLOGY | Facility: HOSPITAL | Age: 64
End: 2024-07-16
Payer: MEDICAID

## 2024-07-16 VITALS
SYSTOLIC BLOOD PRESSURE: 140 MMHG | BODY MASS INDEX: 29.49 KG/M2 | DIASTOLIC BLOOD PRESSURE: 74 MMHG | WEIGHT: 177 LBS | RESPIRATION RATE: 20 BRPM | HEIGHT: 65 IN | TEMPERATURE: 98.6 F | HEART RATE: 96 BPM | OXYGEN SATURATION: 96 %

## 2024-07-16 DIAGNOSIS — S93.602A FOOT SPRAIN, LEFT, INITIAL ENCOUNTER: ICD-10-CM

## 2024-07-16 DIAGNOSIS — J02.9 VIRAL PHARYNGITIS: Primary | ICD-10-CM

## 2024-07-16 DIAGNOSIS — S09.90XA INJURY OF HEAD, INITIAL ENCOUNTER: ICD-10-CM

## 2024-07-16 LAB — S PYO DNA THROAT QL NAA+PROBE: NOT DETECTED

## 2024-07-16 PROCEDURE — 72125 CT NECK SPINE W/O DYE: CPT | Performed by: RADIOLOGY

## 2024-07-16 PROCEDURE — 73630 X-RAY EXAM OF FOOT: CPT | Mod: LT

## 2024-07-16 PROCEDURE — 87651 STREP A DNA AMP PROBE: CPT

## 2024-07-16 PROCEDURE — 70450 CT HEAD/BRAIN W/O DYE: CPT | Performed by: RADIOLOGY

## 2024-07-16 PROCEDURE — 70450 CT HEAD/BRAIN W/O DYE: CPT

## 2024-07-16 PROCEDURE — 99285 EMERGENCY DEPT VISIT HI MDM: CPT | Mod: 25

## 2024-07-16 PROCEDURE — 2500000001 HC RX 250 WO HCPCS SELF ADMINISTERED DRUGS (ALT 637 FOR MEDICARE OP)

## 2024-07-16 PROCEDURE — 72125 CT NECK SPINE W/O DYE: CPT

## 2024-07-16 RX ORDER — IBUPROFEN 400 MG/1
400 TABLET ORAL ONCE
Status: COMPLETED | OUTPATIENT
Start: 2024-07-16 | End: 2024-07-16

## 2024-07-16 RX ORDER — ACETAMINOPHEN 325 MG/1
975 TABLET ORAL ONCE
Status: DISCONTINUED | OUTPATIENT
Start: 2024-07-16 | End: 2024-07-16

## 2024-07-16 ASSESSMENT — PAIN SCALES - GENERAL
PAINLEVEL_OUTOF10: 4
PAINLEVEL_OUTOF10: 7

## 2024-07-16 ASSESSMENT — COLUMBIA-SUICIDE SEVERITY RATING SCALE - C-SSRS
1. IN THE PAST MONTH, HAVE YOU WISHED YOU WERE DEAD OR WISHED YOU COULD GO TO SLEEP AND NOT WAKE UP?: NO
6. HAVE YOU EVER DONE ANYTHING, STARTED TO DO ANYTHING, OR PREPARED TO DO ANYTHING TO END YOUR LIFE?: NO
2. HAVE YOU ACTUALLY HAD ANY THOUGHTS OF KILLING YOURSELF?: NO

## 2024-07-16 ASSESSMENT — PAIN DESCRIPTION - PAIN TYPE: TYPE: ACUTE PAIN

## 2024-07-16 ASSESSMENT — PAIN DESCRIPTION - DESCRIPTORS: DESCRIPTORS: ACHING

## 2024-07-16 ASSESSMENT — PAIN DESCRIPTION - ORIENTATION: ORIENTATION: LEFT

## 2024-07-16 ASSESSMENT — PAIN DESCRIPTION - FREQUENCY: FREQUENCY: CONSTANT/CONTINUOUS

## 2024-07-16 ASSESSMENT — PAIN - FUNCTIONAL ASSESSMENT
PAIN_FUNCTIONAL_ASSESSMENT: 0-10
PAIN_FUNCTIONAL_ASSESSMENT: 0-10

## 2024-07-16 NOTE — DISCHARGE INSTRUCTIONS
Please follow-up closely with your primary care provider in the following week.  Continue Tylenol and ibuprofen as needed for aches and pains.  Continue over-the-counter cold and flu medications help with sore throat.

## 2024-07-16 NOTE — ED PROVIDER NOTES
HPI   Chief Complaint   Patient presents with    Fall     Tripped and fell injurying left foot knee and face       Patient is a 64-year-old female who presents emergency department for evaluation of mechanical trip and fall with head injury and left foot injury.  Patient states that she was walking into an urgent care as she wanted to get a strep test done as she has had a sore throat over the last several days.  She states while walking into the urgent care she tripped over a parking block and fell forward hitting the front of her head.  She did not have any loss of consciousness and she is not currently on any blood thinners.  She has pain on the front of her head that is dull and aching in nature.  She also states that in the process she twisted her left foot and is having dull aching pain over the dorsum of the left foot.  She is able to ambulate on it, but given the pain in her head and foot she presents here for further evaluation.  She states that as far as her sore throat goes she has had a sore throat since yesterday with no cough or congestion with some runny nose and no other symptoms.  She denies any associated lightheadedness, dizziness, nausea, vomiting, fevers, chills, or other concerns at this time.      History provided by:  Patient   used: No            Patient History   Past Medical History:   Diagnosis Date    Anemia     does not have    CAD (coronary artery disease)     Chronic tension headache     resolved 11/30/2020    COVID     June 20222    Depression     Diabetes mellitus (Multi)     non insulin depndent    Hashimoto's disease     Hepatic steatosis     Hypercalcemia     resolved 6/2/2020    Hyperlipidemia     Hypertension     Hypertensive retinopathy     with preglaucoma- Dr Zhao    Iron deficiency anemia     does not have    Psoriasis     Thoracic compression fracture (Multi)     T11 from traumatic fall down stairs    Uterine fibroid      Past Surgical History:    Procedure Laterality Date     SECTION, CLASSIC      OOPHORECTOMY Right     OVARIAN CYST REMOVAL Right     SHOULDER OPEN ROTATOR CUFF REPAIR Left      Family History   Problem Relation Name Age of Onset    COPD Mother      Cancer Father      Anxiety disorder Daughter       Social History     Tobacco Use    Smoking status: Former     Current packs/day: 0.00     Types: Cigarettes     Quit date: 2009     Years since quitting: 15.2    Smokeless tobacco: Never   Vaping Use    Vaping status: Never Used   Substance Use Topics    Alcohol use: Yes     Comment: sometimes    Drug use: Yes     Types: Marijuana, Mescaline     Comment: 18-22 yr old       Physical Exam   ED Triage Vitals   Temperature Heart Rate Respirations BP   24 1450 24 1450 24 1450 24 1453   37 °C (98.6 °F) 96 20 140/74      Pulse Ox Temp Source Heart Rate Source Patient Position   24 1450 24 1450 24 1450 24 1450   96 % Oral Monitor Sitting      BP Location FiO2 (%)     24 1450 --     Right arm        Physical Exam  Constitutional:       Appearance: Normal appearance.   HENT:      Head: Normocephalic.      Comments: Tenderness palpation over frontal bone.     Mouth/Throat:      Mouth: Mucous membranes are moist.      Comments: No obvious dental trauma.  Scattered pharyngeal erythema with no tonsillar swelling or exudates.  Cardiovascular:      Rate and Rhythm: Normal rate and regular rhythm.   Pulmonary:      Effort: Pulmonary effort is normal.      Breath sounds: Normal breath sounds.   Musculoskeletal:         General: Tenderness and signs of injury present. Normal range of motion.      Cervical back: Normal range of motion. No tenderness.      Comments: Tenderness palpation over dorsum of left foot with good range of motion at left ankle and digits with good capillary refill.   Skin:     General: Skin is warm and dry.   Neurological:      General: No focal deficit present.      Mental  Status: She is alert and oriented to person, place, and time.           ED Course & MDM   Diagnoses as of 07/16/24 2305   Viral pharyngitis   Foot sprain, left, initial encounter   Injury of head, initial encounter                       Ricco Coma Scale Score: 15                        Medical Decision Making  Patient is a 64-year-old female presents emergency department for evaluation of mechanical trip and fall with head injury and sore throat.    Lab work done today included strep test.    Scans done today were interpreted/confirmed by radiologist and also interpreted by me which included CT head without contrast, CT cervical spine without contrast, x-ray left foot.  X-ray left foot shows no acute fracture or dislocation left foot.  CT head and neck shows no acute intracranial abnormality with possible retention cyst versus polyp in the right maxillary sinus with CT cervical spine showing no acute fracture or dislocation of cervical spine with degenerative disc space narrowing at C4 and C5 as well as C5 and C6 with spondylosis.    Medications given at today's visit include p.o. ibuprofen    I saw this patient independently.  Patient main stable in the emergency department.  CT imaging reveals no acute intracranial pathology and no evidence for acute cervical spine injury.  Left foot x-rays show no acute fracture or abnormality.  Patient has signs symptoms most consistent with head injury and contusion as well as contusion to left foot.  As for her sore throat, strep test is negative today.  Patient has clinical symptoms, history, and physical exam consistent with viral syndrome.  Given length of symptoms and relatively benign exam with stable vital signs, patient is able to be discharged home for supportive care outpatient.  There appears to be no evidence of any secondary bacterial infections including otitis media, pneumonia, exudative pharyngitis, CNS infections, or concern for widespread systemic infection  such as sepsis.  Patient was educated about measures to take for symptom management including ibuprofen, Tylenol, and over-the-counter cold medications to help with symptoms.  Patient was advised to increase fluids to prevent any dehydration.  Patient educated on continued symptom management and given head injury also educated on concussion protocol.  She is agreeable to plan and discharge at this time.  She will follow-up closely with primary care provider outpatient.  Emergent pathologies were considered for this patient, although I have low suspicion for anything acutely emergent given patient's clinical presentation, history, physical exam, stable vital signs, and relatively unremarkable workup.  Discharging patient home is reasonable plan of care for outpatient management.    All labs, imaging, and diagnostic studies were reviewed by me and patient was counseled on clinical impression, expectations, and plan.  Patient was educated to follow-up with PCP in the following 1-2 days.  All questions from patient were answered. They elicited understanding and were agreeable to course of treatment.  Patient was discharged in stable condition and given strict return precautions.    ** Disclaimer:  Parts of this document were written utilizing a voice to text dictation software.  Note may contain minor transcription or typographical errors that were inadvertently transcribed by the computer software.        Procedure  Procedures     Dee Ackerman PA-C  07/16/24 1604

## 2024-08-04 DIAGNOSIS — M05.79 SEROPOSITIVE RHEUMATOID ARTHRITIS OF MULTIPLE JOINTS (MULTI): Primary | ICD-10-CM

## 2024-08-05 RX ORDER — METHOTREXATE 2.5 MG/1
TABLET ORAL
Qty: 20 TABLET | Refills: 11 | Status: SHIPPED | OUTPATIENT
Start: 2024-08-05

## 2024-08-06 PROCEDURE — RXMED WILLOW AMBULATORY MEDICATION CHARGE

## 2024-08-09 ENCOUNTER — PHARMACY VISIT (OUTPATIENT)
Dept: PHARMACY | Facility: CLINIC | Age: 64
End: 2024-08-09
Payer: MEDICAID

## 2024-08-28 ENCOUNTER — LAB (OUTPATIENT)
Dept: LAB | Facility: LAB | Age: 64
End: 2024-08-28
Payer: MEDICAID

## 2024-08-28 DIAGNOSIS — E83.52 HYPERCALCEMIA: ICD-10-CM

## 2024-08-28 LAB
ALBUMIN SERPL BCP-MCNC: 4.4 G/DL (ref 3.4–5)
ALP SERPL-CCNC: 56 U/L (ref 33–136)
ALT SERPL W P-5'-P-CCNC: 19 U/L (ref 7–45)
ANION GAP SERPL CALC-SCNC: 13 MMOL/L (ref 10–20)
AST SERPL W P-5'-P-CCNC: 18 U/L (ref 9–39)
BILIRUB SERPL-MCNC: 0.2 MG/DL (ref 0–1.2)
BUN SERPL-MCNC: 18 MG/DL (ref 6–23)
CALCIUM SERPL-MCNC: 9.8 MG/DL (ref 8.6–10.3)
CHLORIDE SERPL-SCNC: 102 MMOL/L (ref 98–107)
CO2 SERPL-SCNC: 29 MMOL/L (ref 21–32)
CREAT SERPL-MCNC: 0.78 MG/DL (ref 0.5–1.05)
EGFRCR SERPLBLD CKD-EPI 2021: 85 ML/MIN/1.73M*2
GLUCOSE SERPL-MCNC: 130 MG/DL (ref 74–99)
PHOSPHATE SERPL-MCNC: 4 MG/DL (ref 2.5–4.9)
POTASSIUM SERPL-SCNC: 4.2 MMOL/L (ref 3.5–5.3)
PROT SERPL-MCNC: 7.8 G/DL (ref 6.4–8.2)
PROT SERPL-MCNC: 7.9 G/DL (ref 6.4–8.2)
PROT UR-ACNC: 6 MG/DL (ref 5–25)
PTH-INTACT SERPL-MCNC: 22.2 PG/ML (ref 18.5–88)
SODIUM SERPL-SCNC: 140 MMOL/L (ref 136–145)

## 2024-08-28 PROCEDURE — 83970 ASSAY OF PARATHORMONE: CPT

## 2024-08-28 PROCEDURE — 84155 ASSAY OF PROTEIN SERUM: CPT

## 2024-08-28 PROCEDURE — 80053 COMPREHEN METABOLIC PANEL: CPT

## 2024-08-28 PROCEDURE — 84100 ASSAY OF PHOSPHORUS: CPT

## 2024-08-28 PROCEDURE — 84166 PROTEIN E-PHORESIS/URINE/CSF: CPT

## 2024-08-28 PROCEDURE — 84156 ASSAY OF PROTEIN URINE: CPT

## 2024-08-28 PROCEDURE — 36415 COLL VENOUS BLD VENIPUNCTURE: CPT

## 2024-08-28 PROCEDURE — 84165 PROTEIN E-PHORESIS SERUM: CPT

## 2024-08-29 LAB
ALBUMIN MFR UR ELPH: 67.7 %
ALBUMIN: 4.2 G/DL (ref 3.4–5)
ALPHA 1 GLOBULIN: 0.3 G/DL (ref 0.2–0.6)
ALPHA 2 GLOBULIN: 0.7 G/DL (ref 0.4–1.1)
ALPHA1 GLOB MFR UR ELPH: 4 %
ALPHA2 GLOB MFR UR ELPH: 6.7 %
B-GLOBULIN MFR UR ELPH: 11.4 %
BETA GLOBULIN: 1.2 G/DL (ref 0.5–1.2)
GAMMA GLOB MFR UR ELPH: 10.2 %
GAMMA GLOBULIN: 1.5 G/DL (ref 0.5–1.4)
PATH REVIEW-SERUM PROTEIN ELECTROPHORESIS: ABNORMAL
PATH REVIEW-URINE PROTEIN ELECTROPHORESIS: NORMAL
PROTEIN ELECTROPHORESIS COMMENT: ABNORMAL
URINE ELECTROPHORESIS COMMENT: NORMAL

## 2024-08-30 PROCEDURE — RXMED WILLOW AMBULATORY MEDICATION CHARGE

## 2024-09-04 ENCOUNTER — PHARMACY VISIT (OUTPATIENT)
Dept: PHARMACY | Facility: CLINIC | Age: 64
End: 2024-09-04
Payer: MEDICAID

## 2024-09-09 NOTE — PROGRESS NOTES
HPI   63 yo presents for evaluation of elevated calcium.  Pt with dm2, htn, dyslipidemia, hashimoto's disease, depression, psoriasis.     Was taking losartan/hydrochlorothiazide 100/25mg, amlodipine 10mg, toprol xl 200mg for htn.     Taking levothyroxine 112mcg , euthyroid, no obstructive sx.     Calcium/parathyroid:  Jan 22, 2024: pth-16, calcium-10.8, tsh-0.358 from pathology labs with pcp      Dec 2023 labs reveal a calcium of 10.1, 25-OH D-55, 1,25 D-wnl.  In the past was taking vitamin D 2,000-4,000 international units, not currently taking.  No calcium supplements, had taken biotin in the past. Has been on a thiazide for several years.     Pt had fractured metatarsal in her 20's, had a T11 compression fracture in her 50's after falling down the stairs.  No hx of kidney stones.     Dexa 12/15/2023: T score: L spine -0.4, L total femur + 2.2, L femoral neck -0.8    Since last visit:  -off hydrochlorothiazide blood pressure stable  -march 2024 ca-10.2/pth-19.5 , aug 2024 ca-9.8/pth-22.2    -of note spep with gamma globulin protein mildly elevated to 1.7 and 1.5 on the above dates      Current Outpatient Medications:     amLODIPine (Norvasc) 10 mg tablet, Take 1 tablet (10 mg) by mouth once daily., Disp: , Rfl:     B complex-vitamin C-folic acid (Nephro-Jael Rx) 1- mg-mg-mcg tablet, Take 1 tablet by mouth once daily with breakfast., Disp: , Rfl:     cholecalciferol (Vitamin D-3) 50 MCG (2000 UT) tablet, 1 capsule Orally, Disp: , Rfl:     clobetasol (Temovate) 0.05 % ointment, APPLY TOPICALLY TO THE AFFECTED AREA TWICE DAILY, Disp: , Rfl:     colchicine 0.6 mg tablet, Take 1 tablet (0.6 mg) by mouth if needed for muscle/joint pain. As needed not more than two, Disp: , Rfl:     cyanocobalamin (Vitamin B-12) 1,000 mcg tablet, Take 1 tablet (1,000 mcg) by mouth once daily., Disp: , Rfl:     dapagliflozin propanediol (Farxiga) 10 mg, Take 1 tablet (10 mg) by mouth once daily., Disp: , Rfl:     diclofenac sodium  (Voltaren) 1 % gel, Apply 4.5 inches (4 g) topically 4 times a day., Disp: 100 g, Rfl: 3    dulaglutide (Trulicity) 1.5 mg/0.5 mL pen injector injection, Inject 1.5 mg under the skin every 7 days., Disp: 2 mL, Rfl: 3    fenofibrate (Tricor) 145 mg tablet, Take 1 tablet (145 mg) by mouth once daily., Disp: , Rfl:     ferrous gluconate 256 mg (28 mg iron) tablet, Take 1 tablet (28 mg) by mouth 1 (one) time per week. Takes one tab once a week, Disp: , Rfl:     hydroxychloroquine (Plaquenil) 200 mg tablet, , Disp: , Rfl:     icosapent ethyL (Vascepa) 1 gram capsule, Take 4 capsules (4 g) by mouth once daily., Disp: 360 capsule, Rfl: 3    leucovorin (Wellcovorin) 5 mg tablet, Take with a full glass of water every day except the day of methotrexate., Disp: 30 tablet, Rfl: 11    levothyroxine (Synthroid) 112 mcg tablet, Take 1 tablet (112 mcg) by mouth once daily in the morning. Take before meals. One tablet Monday thru Saturday and one and a half on sunday, Disp: , Rfl:     lisinopril 10 mg tablet, Take by mouth once every 24 hours., Disp: , Rfl:     losartan (Cozaar) 100 mg tablet, Take 1 tablet (100 mg) by mouth once daily., Disp: 90 tablet, Rfl: 3    lubricating eye drops ophthalmic solution, 1 drop if needed for dry eyes., Disp: , Rfl:     metFORMIN  mg 24 hr tablet, Take 2 tablets (1,000 mg) by mouth 2 times a day., Disp: , Rfl:     methotrexate (Trexall) 2.5 mg tablet, TAKE 5 TABLET BY MOUTH ONE TIME PER WEEK, Disp: 20 tablet, Rfl: 11    metoprolol succinate XL (Toprol-XL) 200 mg 24 hr tablet, Take 1 tablet (200 mg) by mouth once daily. Do not crush or chew., Disp: , Rfl:     multivitamin with minerals tablet, Take 1 tablet by mouth once daily., Disp: , Rfl:     omeprazole OTC (PriLOSEC OTC) 20 mg EC tablet, Take 1 tablet (20 mg) by mouth once daily in the morning. Take before meals. Do not crush, chew, or split., Disp: , Rfl:     rosuvastatin (Crestor) 10 mg tablet, 1 tablet Orally Once a day, Disp: , Rfl:     " traMADol (Ultram) 50 mg tablet, TK 1 T PO  BID PRN, Disp: , Rfl:     traZODone (Desyrel) 50 mg tablet, 1 tablet at bedtime Orally Once a day, Disp: , Rfl:     Trulicity 4.5 mg/0.5 mL pen injector, USE AS DIRECTED SUBCUTAENOUS ONCE A WEEK FOR 30 DAYS, Disp: , Rfl:     venlafaxine XR (Effexor-XR) 37.5 mg 24 hr capsule, TAKE 1 CAPSULE BY MOUTH EVERY DAY WITH FOOD, Disp: , Rfl:     dulaglutide (Trulicity) 0.75 mg/0.5 mL pen injector, Inject 0.75 mg into skin once a week (Patient not taking: Reported on 9/11/2024), Disp: 2 mL, Rfl: 2    dulaglutide (Trulicity) 0.75 mg/0.5 mL pen injector, Inject 0.75 mg under the skin every 7 days. (Patient not taking: Reported on 9/11/2024), Disp: 2 mL, Rfl: 2    dulaglutide (Trulicity) 3 mg/0.5 mL pen injector, Inject 4.5 mg under the skin 1 (one) time per week. Once a week on Tuesday, Disp: , Rfl:       Allergies as of 09/11/2024 - Reviewed 09/11/2024   Allergen Reaction Noted    Acetaminophen-codeine Unknown 09/11/2023    Etomidate Other and Unknown 09/11/2023    Hydrocodone Nausea/vomiting and Unknown 09/11/2023    Hydrocodone-acetaminophen Nausea/vomiting 06/17/2024    Percocet [oxycodone-acetaminophen] Unknown 12/14/2023    Lovastatin Unknown 09/11/2023    Naproxen Other and Unknown 09/11/2023         Review of Systems   Cardiology: Lightheadedness-denies.  Chest pain-denies.  Leg edema-denies.  Palpitations-denies.  Respiratory: Cough-denies. Shortness of breath-denies.  Wheezing-denies.  Gastroenterology: Constipation-denies.  Diarrhea-denies.  Heartburn-denies.  Endocrinology: Cold intolerance-denies.  Heat intolerance-denies.  Sweats-denies.  Neurology: Headache-denies.  Tremor-denies.  Neuropathy in extremities-denies.  Psychology: Low energy-denies.  Irritability-denies.  Sleep disturbances-denies.      /60 (BP Location: Right arm, Patient Position: Sitting)   Pulse 80   Ht 1.651 m (5' 5\")   Wt 81.6 kg (180 lb)   BMI 29.95 kg/m²       Labs:  Lab Results " "  Component Value Date    WBC 7.6 06/24/2024    NRBC 0.0 06/24/2024    RBC 4.55 06/24/2024    HGB 12.2 06/24/2024    HCT 39.6 06/24/2024     06/24/2024     Lab Results   Component Value Date    CALCIUM 9.8 08/28/2024    AST 18 08/28/2024    ALKPHOS 56 08/28/2024    BILITOT 0.2 08/28/2024    PROT 7.8 08/28/2024    PROT 7.9 08/28/2024    ALBUMIN 4.4 08/28/2024     08/28/2024    K 4.2 08/28/2024     08/28/2024    CO2 29 08/28/2024    ANIONGAP 13 08/28/2024    BUN 18 08/28/2024    CREATININE 0.78 08/28/2024    GLUCOSE 130 (H) 08/28/2024    ALT 19 08/28/2024    EGFR 85 08/28/2024     No results found for: \"CHOL\", \"TRIG\", \"HDL\", \"LDLCALC\"  Lab Results   Component Value Date    MICROALBCREA 360.3 (H) 06/24/2024     +    Physical Exam   General Appearance: pleasant, cooperative, no acute distress  HEENT: no chemosis, no proptosis, no lid lag, no lid retraction  Neck: no lymphadenopathy, no thyromegaly, no dominant thyroid nodules  Heart: no murmurs, regular rate and rhythm, S1 and S2  Lungs: no wheezes, no rhonci, no rales  Extremities: no lower extremity swelling      Assessment/Plan   1. Hypercalcemia  -appears to be in normal range off hydrochlorothiazide  -would simply follow calcium levels yearly with pcp    During the workup for hypercalcemia without elevated pth we checked spep/upep.  -gamma globulin very mildly elevated on 2 occasions in the the past year 2024  -would like hematology to evaluate pt and comment on long term monitoring for this         Follow Up:  prn    -labs/tests/notes reviewed  -reviewed and counseled patient on medication monitoring and side effects          "

## 2024-09-11 ENCOUNTER — APPOINTMENT (OUTPATIENT)
Dept: ENDOCRINOLOGY | Facility: CLINIC | Age: 64
End: 2024-09-11
Payer: MEDICAID

## 2024-09-11 VITALS
WEIGHT: 180 LBS | SYSTOLIC BLOOD PRESSURE: 135 MMHG | HEIGHT: 65 IN | BODY MASS INDEX: 29.99 KG/M2 | DIASTOLIC BLOOD PRESSURE: 60 MMHG | HEART RATE: 80 BPM

## 2024-09-11 DIAGNOSIS — E83.52 HYPERCALCEMIA: Primary | ICD-10-CM

## 2024-09-11 PROCEDURE — 3078F DIAST BP <80 MM HG: CPT | Performed by: INTERNAL MEDICINE

## 2024-09-11 PROCEDURE — 4010F ACE/ARB THERAPY RXD/TAKEN: CPT | Performed by: INTERNAL MEDICINE

## 2024-09-11 PROCEDURE — 3075F SYST BP GE 130 - 139MM HG: CPT | Performed by: INTERNAL MEDICINE

## 2024-09-11 PROCEDURE — 3008F BODY MASS INDEX DOCD: CPT | Performed by: INTERNAL MEDICINE

## 2024-09-11 PROCEDURE — 1036F TOBACCO NON-USER: CPT | Performed by: INTERNAL MEDICINE

## 2024-09-11 PROCEDURE — 3060F POS MICROALBUMINURIA REV: CPT | Performed by: INTERNAL MEDICINE

## 2024-09-11 PROCEDURE — 99213 OFFICE O/P EST LOW 20 MIN: CPT | Performed by: INTERNAL MEDICINE

## 2024-09-11 ASSESSMENT — PATIENT HEALTH QUESTIONNAIRE - PHQ9: 2. FEELING DOWN, DEPRESSED OR HOPELESS: NOT AT ALL

## 2024-09-11 ASSESSMENT — PAIN SCALES - GENERAL: PAINLEVEL: 2

## 2024-10-04 PROCEDURE — RXMED WILLOW AMBULATORY MEDICATION CHARGE

## 2024-10-08 ENCOUNTER — PHARMACY VISIT (OUTPATIENT)
Dept: PHARMACY | Facility: CLINIC | Age: 64
End: 2024-10-08
Payer: MEDICAID

## 2024-10-29 PROCEDURE — RXMED WILLOW AMBULATORY MEDICATION CHARGE

## 2024-11-01 ENCOUNTER — PHARMACY VISIT (OUTPATIENT)
Dept: PHARMACY | Facility: CLINIC | Age: 64
End: 2024-11-01
Payer: MEDICAID

## 2024-11-05 ENCOUNTER — LAB (OUTPATIENT)
Dept: LAB | Facility: LAB | Age: 64
End: 2024-11-05
Payer: MEDICAID

## 2024-11-05 DIAGNOSIS — E06.3 HASHIMOTO'S DISEASE: ICD-10-CM

## 2024-11-05 DIAGNOSIS — M10.9 GOUT WITH MANIFESTATIONS: ICD-10-CM

## 2024-11-05 DIAGNOSIS — R76.8 POSITIVE ANA (ANTINUCLEAR ANTIBODY): ICD-10-CM

## 2024-11-05 DIAGNOSIS — M05.79 SEROPOSITIVE RHEUMATOID ARTHRITIS OF MULTIPLE JOINTS (MULTI): ICD-10-CM

## 2024-11-05 DIAGNOSIS — E61.1 IRON DEFICIENCY: ICD-10-CM

## 2024-11-05 DIAGNOSIS — E55.9 VITAMIN D DEFICIENCY: ICD-10-CM

## 2024-11-05 LAB
25(OH)D3 SERPL-MCNC: 60 NG/ML (ref 30–100)
ALBUMIN SERPL BCP-MCNC: 4.3 G/DL (ref 3.4–5)
ALP SERPL-CCNC: 68 U/L (ref 33–136)
ALT SERPL W P-5'-P-CCNC: 19 U/L (ref 7–45)
ANION GAP SERPL CALC-SCNC: 16 MMOL/L (ref 10–20)
APPEARANCE UR: CLEAR
AST SERPL W P-5'-P-CCNC: 18 U/L (ref 9–39)
BASOPHILS # BLD AUTO: 0.05 X10*3/UL (ref 0–0.1)
BASOPHILS NFR BLD AUTO: 0.7 %
BILIRUB SERPL-MCNC: 0.2 MG/DL (ref 0–1.2)
BILIRUB UR STRIP.AUTO-MCNC: NEGATIVE MG/DL
BUN SERPL-MCNC: 21 MG/DL (ref 6–23)
C3 SERPL-MCNC: 212 MG/DL (ref 87–200)
C4 SERPL-MCNC: 72 MG/DL (ref 10–50)
CALCIUM SERPL-MCNC: 9.9 MG/DL (ref 8.6–10.3)
CHLORIDE SERPL-SCNC: 101 MMOL/L (ref 98–107)
CK SERPL-CCNC: 89 U/L (ref 0–215)
CO2 SERPL-SCNC: 28 MMOL/L (ref 21–32)
COLOR UR: ABNORMAL
CREAT SERPL-MCNC: 0.67 MG/DL (ref 0.5–1.05)
CREAT UR-MCNC: 61.1 MG/DL (ref 20–320)
CRP SERPL-MCNC: 0.67 MG/DL
DSDNA AB SER-ACNC: 2 IU/ML
EGFRCR SERPLBLD CKD-EPI 2021: >90 ML/MIN/1.73M*2
EOSINOPHIL # BLD AUTO: 0.31 X10*3/UL (ref 0–0.7)
EOSINOPHIL NFR BLD AUTO: 4.2 %
ERYTHROCYTE [DISTWIDTH] IN BLOOD BY AUTOMATED COUNT: 14.8 % (ref 11.5–14.5)
ERYTHROCYTE [SEDIMENTATION RATE] IN BLOOD BY WESTERGREN METHOD: 6 MM/H (ref 0–30)
FERRITIN SERPL-MCNC: 41 NG/ML (ref 8–150)
GLUCOSE SERPL-MCNC: 134 MG/DL (ref 74–99)
GLUCOSE UR STRIP.AUTO-MCNC: ABNORMAL MG/DL
HCT VFR BLD AUTO: 41 % (ref 36–46)
HGB BLD-MCNC: 12.6 G/DL (ref 12–16)
HOLD SPECIMEN: NORMAL
IMM GRANULOCYTES # BLD AUTO: 0.02 X10*3/UL (ref 0–0.7)
IMM GRANULOCYTES NFR BLD AUTO: 0.3 % (ref 0–0.9)
IRON SATN MFR SERPL: 12 % (ref 25–45)
IRON SERPL-MCNC: 57 UG/DL (ref 35–150)
KETONES UR STRIP.AUTO-MCNC: NEGATIVE MG/DL
LEUKOCYTE ESTERASE UR QL STRIP.AUTO: NEGATIVE
LYMPHOCYTES # BLD AUTO: 2.84 X10*3/UL (ref 1.2–4.8)
LYMPHOCYTES NFR BLD AUTO: 38.8 %
MCH RBC QN AUTO: 27.5 PG (ref 26–34)
MCHC RBC AUTO-ENTMCNC: 30.7 G/DL (ref 32–36)
MCV RBC AUTO: 90 FL (ref 80–100)
MICROALBUMIN UR-MCNC: 306.1 MG/L
MICROALBUMIN/CREAT UR: 501 UG/MG CREAT
MONOCYTES # BLD AUTO: 0.56 X10*3/UL (ref 0.1–1)
MONOCYTES NFR BLD AUTO: 7.7 %
NEUTROPHILS # BLD AUTO: 3.54 X10*3/UL (ref 1.2–7.7)
NEUTROPHILS NFR BLD AUTO: 48.3 %
NITRITE UR QL STRIP.AUTO: NEGATIVE
NRBC BLD-RTO: 0 /100 WBCS (ref 0–0)
PH UR STRIP.AUTO: 5.5 [PH]
PLATELET # BLD AUTO: 426 X10*3/UL (ref 150–450)
POTASSIUM SERPL-SCNC: 4.4 MMOL/L (ref 3.5–5.3)
PROT SERPL-MCNC: 7.4 G/DL (ref 6.4–8.2)
PROT UR STRIP.AUTO-MCNC: ABNORMAL MG/DL
RBC # BLD AUTO: 4.58 X10*6/UL (ref 4–5.2)
RBC # UR STRIP.AUTO: ABNORMAL /UL
RBC #/AREA URNS AUTO: NORMAL /HPF
SODIUM SERPL-SCNC: 141 MMOL/L (ref 136–145)
SP GR UR STRIP.AUTO: 1.02
T4 FREE SERPL-MCNC: 1.14 NG/DL (ref 0.61–1.12)
TIBC SERPL-MCNC: 486 UG/DL (ref 240–445)
TSH SERPL-ACNC: 1.12 MIU/L (ref 0.44–3.98)
UIBC SERPL-MCNC: 429 UG/DL (ref 110–370)
URATE SERPL-MCNC: 5.7 MG/DL (ref 2.3–6.7)
UROBILINOGEN UR STRIP.AUTO-MCNC: NORMAL MG/DL
WBC # BLD AUTO: 7.3 X10*3/UL (ref 4.4–11.3)
WBC #/AREA URNS AUTO: NORMAL /HPF

## 2024-11-05 PROCEDURE — 86160 COMPLEMENT ANTIGEN: CPT

## 2024-11-05 PROCEDURE — 82728 ASSAY OF FERRITIN: CPT

## 2024-11-05 PROCEDURE — 83529 ASAY OF INTERLEUKIN-6 (IL-6): CPT

## 2024-11-05 PROCEDURE — 84443 ASSAY THYROID STIM HORMONE: CPT

## 2024-11-05 PROCEDURE — 85025 COMPLETE CBC W/AUTO DIFF WBC: CPT

## 2024-11-05 PROCEDURE — 81490 AUTOIMMUNE RA ALYS 12 BMRK: CPT

## 2024-11-05 PROCEDURE — 84550 ASSAY OF BLOOD/URIC ACID: CPT

## 2024-11-05 PROCEDURE — 82043 UR ALBUMIN QUANTITATIVE: CPT

## 2024-11-05 PROCEDURE — 84439 ASSAY OF FREE THYROXINE: CPT

## 2024-11-05 PROCEDURE — 81001 URINALYSIS AUTO W/SCOPE: CPT

## 2024-11-05 PROCEDURE — 36415 COLL VENOUS BLD VENIPUNCTURE: CPT

## 2024-11-05 PROCEDURE — 86225 DNA ANTIBODY NATIVE: CPT

## 2024-11-05 PROCEDURE — 82570 ASSAY OF URINE CREATININE: CPT

## 2024-11-05 PROCEDURE — 82306 VITAMIN D 25 HYDROXY: CPT

## 2024-11-05 PROCEDURE — 80053 COMPREHEN METABOLIC PANEL: CPT

## 2024-11-05 PROCEDURE — 86140 C-REACTIVE PROTEIN: CPT

## 2024-11-05 PROCEDURE — 83540 ASSAY OF IRON: CPT

## 2024-11-05 PROCEDURE — 83550 IRON BINDING TEST: CPT

## 2024-11-05 PROCEDURE — 85652 RBC SED RATE AUTOMATED: CPT

## 2024-11-05 PROCEDURE — 82550 ASSAY OF CK (CPK): CPT

## 2024-11-08 LAB — IL6 SERPL-MCNC: <2 PG/ML

## 2024-11-13 LAB — SCAN RESULT: NORMAL

## 2024-11-19 LAB — C1Q SERPL-MCNC: 14.2 MG/DL (ref 10.3–20.5)

## 2024-11-20 ENCOUNTER — OFFICE VISIT (OUTPATIENT)
Dept: RHEUMATOLOGY | Facility: CLINIC | Age: 64
End: 2024-11-20
Payer: MEDICAID

## 2024-11-20 VITALS
WEIGHT: 179.6 LBS | BODY MASS INDEX: 29.89 KG/M2 | HEART RATE: 79 BPM | SYSTOLIC BLOOD PRESSURE: 151 MMHG | OXYGEN SATURATION: 99 % | DIASTOLIC BLOOD PRESSURE: 73 MMHG

## 2024-11-20 DIAGNOSIS — E06.3 HASHIMOTO'S DISEASE: ICD-10-CM

## 2024-11-20 DIAGNOSIS — M35.09 SJOGREN'S SYNDROME WITH OTHER ORGAN INVOLVEMENT: ICD-10-CM

## 2024-11-20 DIAGNOSIS — E61.1 IRON DEFICIENCY: ICD-10-CM

## 2024-11-20 DIAGNOSIS — Z79.899 ENCOUNTER FOR LONG-TERM (CURRENT) USE OF MEDICATIONS: ICD-10-CM

## 2024-11-20 DIAGNOSIS — M05.79 SEROPOSITIVE RHEUMATOID ARTHRITIS OF MULTIPLE JOINTS (MULTI): Primary | ICD-10-CM

## 2024-11-20 DIAGNOSIS — E55.9 VITAMIN D DEFICIENCY: ICD-10-CM

## 2024-11-20 DIAGNOSIS — R76.8 POSITIVE ANA (ANTINUCLEAR ANTIBODY): ICD-10-CM

## 2024-11-20 DIAGNOSIS — M10.9 GOUT WITH MANIFESTATIONS: ICD-10-CM

## 2024-11-20 DIAGNOSIS — L40.0 PLAQUE PSORIASIS: ICD-10-CM

## 2024-11-20 PROCEDURE — 3078F DIAST BP <80 MM HG: CPT | Performed by: INTERNAL MEDICINE

## 2024-11-20 PROCEDURE — 3062F POS MACROALBUMINURIA REV: CPT | Performed by: INTERNAL MEDICINE

## 2024-11-20 PROCEDURE — 4010F ACE/ARB THERAPY RXD/TAKEN: CPT | Performed by: INTERNAL MEDICINE

## 2024-11-20 PROCEDURE — 3077F SYST BP >= 140 MM HG: CPT | Performed by: INTERNAL MEDICINE

## 2024-11-20 PROCEDURE — 99213 OFFICE O/P EST LOW 20 MIN: CPT | Performed by: INTERNAL MEDICINE

## 2024-11-20 RX ORDER — HYDROXYCHLOROQUINE SULFATE 200 MG/1
200 TABLET, FILM COATED ORAL 2 TIMES DAILY
Qty: 180 TABLET | Refills: 3 | Status: SHIPPED | OUTPATIENT
Start: 2024-11-20

## 2024-11-20 RX ORDER — LEUCOVORIN CALCIUM 5 MG/1
5 TABLET ORAL DAILY
Qty: 90 TABLET | Refills: 3 | Status: SHIPPED | OUTPATIENT
Start: 2024-11-20

## 2024-11-20 RX ORDER — PREDNISONE 10 MG/1
10 TABLET ORAL DAILY
Qty: 30 TABLET | Refills: 1 | Status: SHIPPED | OUTPATIENT
Start: 2024-11-20 | End: 2024-12-20

## 2024-11-20 RX ORDER — CLOTRIMAZOLE AND BETAMETHASONE DIPROPIONATE 10; .64 MG/G; MG/G
1 CREAM TOPICAL 2 TIMES DAILY
Qty: 45 G | Refills: 3 | Status: SHIPPED | OUTPATIENT
Start: 2024-11-20 | End: 2024-12-18

## 2024-11-20 RX ORDER — METHOTREXATE 2.5 MG/1
12.5 TABLET ORAL WEEKLY
Qty: 60 TABLET | Refills: 3 | Status: SHIPPED | OUTPATIENT
Start: 2024-11-20

## 2024-11-20 ASSESSMENT — PATIENT HEALTH QUESTIONNAIRE - PHQ9
SUM OF ALL RESPONSES TO PHQ9 QUESTIONS 1 AND 2: 2
1. LITTLE INTEREST OR PLEASURE IN DOING THINGS: SEVERAL DAYS
2. FEELING DOWN, DEPRESSED OR HOPELESS: SEVERAL DAYS
10. IF YOU CHECKED OFF ANY PROBLEMS, HOW DIFFICULT HAVE THESE PROBLEMS MADE IT FOR YOU TO DO YOUR WORK, TAKE CARE OF THINGS AT HOME, OR GET ALONG WITH OTHER PEOPLE: SOMEWHAT DIFFICULT

## 2024-11-20 ASSESSMENT — ROUTINE ASSESSMENT OF PATIENT INDEX DATA (RAPID3)
LIFT_CUP_TO_MOUTH: WITHOUT ANY DIFFICULTY
SUM OF QUESTIONS A TO J: 2
WALK_FLAT_GROUND: WITHOUT ANY DIFFICULTY
PARTIPATE_RECREATIONAL_ACTIVITIES: WITH SOME DIFFICULTY
TURN_FAUCETS_OFF: WITHOUT ANY DIFFICULTY
ON A SCALE OF ONE TO TEN, CONSIDERING ALL THE WAYS IN WHICH ILLNESS AND HEALTH CONDITIONS MAY AFFECT YOU AT THIS TIME, PLEASE INDICATE BELOW HOW YOU ARE DOING:: 0.5
FN_SCORE: 0.7
IN_OUT_BED: WITHOUT ANY DIFFICULTY
WASH_DRY_BODY: WITHOUT ANY DIFFICULTY
TOTAL RAPID3 SCORE: 1.7
WEIGHTED_TOTAL_SCORE: 0.57
ON A SCALE OF ONE TO TEN, HOW MUCH PAIN HAVE YOU HAD BECAUSE OF YOUR CONDITION OVER THE PAST WEEK?: 0.5
WALK_KILOMETERS: WITHOUT ANY DIFFICULTY
GOOD_NIGHTS_SLEEP: WITH SOME DIFFICULTY
DRESS_YOURSELF: WITH SOME DIFFICULTY
SEVERITY_SCORE: 0
FEELINGS_ANXIETY_NERVOUS: WITHOUT ANY DIFFICULTY
IN_OUT_TRANSPORT: WITHOUT ANY DIFFICULTY
ON A SCALE OF ONE TO TEN, CONSIDERING ALL THE WAYS IN WHICH ILLNESS AND HEALTH CONDITIONS MAY AFFECT YOU AT THIS TIME, PLEASE INDICATE BELOW HOW YOU ARE DOING:: 0.5
ON A SCALE OF ONE TO TEN, HOW MUCH PAIN HAVE YOU HAD BECAUSE OF YOUR CONDITION OVER THE PAST WEEK?: 0.5
PICK_CLOTHES_OFF_FLOOR: WITHOUT ANY DIFFICULTY
FEELINGS_DEPRESSION: WITH SOME DIFFICULTY
SEVERITY_SCORE: NEAR REMISSION (NR)

## 2024-11-20 ASSESSMENT — ENCOUNTER SYMPTOMS: DEPRESSION: 1

## 2024-11-20 ASSESSMENT — PAIN SCALES - GENERAL: PAINLEVEL_OUTOF10: 0-NO PAIN

## 2024-11-20 NOTE — PROGRESS NOTES
[facility]/  Rheumatology  [facility] [facility], [address]  [place] [address]  Phone: [phone]  Fax: [phone]    Rheumatology Follow Up Visit 11/20/2024        Fouzia Sanchez is a 64 y.o. female here for follow up.     Last Visit: 7/12/24    HPI    Workup including XR and d/w pt. Soft tissue swelling, periarticular osteopenia, chronic achilles enthesopathy, no erosions. Elevated WBC, PLT, CRP. High Vectra, anti CCP and Anti TPO.  Chief complaint: joint swelling and pain  Since Feb 1, 2023  Sudden onset  Intermittent course  Precipitating factors: slight aching  Associated symptoms: swelling, stiffness, pain  Better: heat  Worse: cold  Occupation:   ROS+   Fatigue  Hair loss  Swelling of feet only when in pain  Abd pain/GERD  AM stiffness, sometimes all day  Joint pain, swelling  Muscle aches and weakness- L leg  Rashes- Psoriasis  Seasonal allergies  Frequent infections as a childp- strep  Pregnancy loss in 1st trimester and 1 stillborn  Numbness/tingling tall finger R hand  Thyroid dz- Hashimoto's  Diabetes  Post menopause  Depression/ Anxiety  Sleep disturbance    Currently tenderness of bilateral shoulders, L elbow, R hip, R 2nd MCP  Swelling of L elbow, soy ankles, R 2nd MCP  AM stiffness sometimes all day, sometimes 2 hr or less.  Patient went to urgent care and dx with gout and tx with colchine. Rheumatology panel done and found to have + JONNY, + SSB, and + RF.  Psoriasis since lost her baby.       FHx: Mother- Crohn's, psoriasis  Brother- Psoriasis, on Humira    Previous Tx  Naproxen- somewhat helpful  Ibuprofen- somewhat helpful  Aspirin- very helpful but takes 4 tablets 4 times a dy for flares  Steroids- very helpful  Colchicine- very helpful  Clobetasol and other creams- no significant help  Betamethasone- some help    Health Maintenance:  DXA- T-0.8 (hip; 12/23); FRAX 7.3%/0.4%  Malignancy Hx- none  Immunization History   Administered Date(s) Administered     Flu vaccine (IIV4), preservative free *Check age/dose* 2016, 2020    Flu vaccine, quadrivalent, no egg protein, age 6 month or greater (FLUCELVAX) 2023    Flu vaccine, trivalent, preservative free, age 6 months and greater (Fluarix/Fluzone/Flulaval) 2020, 10/26/2021, 10/19/2022    Influenza Whole 10/22/2018    Moderna SARS-CoV-2 Vaccination 2021, 2021    Pneumococcal polysaccharide vaccine, 23-valent, age 2 years and older (PNEUMOVAX 23) 10/26/2021    Tdap vaccine, age 7 year and older (BOOSTRIX, ADACEL) 2022       Past Medical History:   Diagnosis Date    Anemia     does not have    CAD (coronary artery disease)     Chronic tension headache     resolved 2020    COVID         Depression     Diabetes mellitus (Multi)     non insulin depndent    Hashimoto's disease     Hepatic steatosis     Hypercalcemia     resolved 2020    Hyperlipidemia     Hypertension     Hypertensive retinopathy     with preglaucoma- Dr Zhao    Iron deficiency anemia     does not have    Psoriasis     Thoracic compression fracture (Multi)     T11 from traumatic fall down stairs    Uterine fibroid       Past Surgical History:   Procedure Laterality Date     SECTION, CLASSIC      OOPHORECTOMY Right     OVARIAN CYST REMOVAL Right     SHOULDER OPEN ROTATOR CUFF REPAIR Left       Current Outpatient Medications   Medication Sig Dispense Refill    amLODIPine (Norvasc) 10 mg tablet Take 1 tablet (10 mg) by mouth once daily.      B complex-vitamin C-folic acid (Nephro-Jael Rx) 1- mg-mg-mcg tablet Take 1 tablet by mouth once daily with breakfast.      cholecalciferol (Vitamin D-3) 50 MCG ( UT) tablet 1 capsule Orally      clobetasol (Temovate) 0.05 % ointment APPLY TOPICALLY TO THE AFFECTED AREA TWICE DAILY      colchicine 0.6 mg tablet Take 1 tablet (0.6 mg) by mouth if needed for muscle/joint pain. As needed not more than two      cyanocobalamin (Vitamin B-12) 1,000 mcg  tablet Take 1 tablet (1,000 mcg) by mouth once daily.      dapagliflozin propanediol (Farxiga) 10 mg Take 1 tablet (10 mg) by mouth once daily.      diclofenac sodium (Voltaren) 1 % gel Apply 4.5 inches (4 g) topically 4 times a day. 100 g 3    dulaglutide (Trulicity) 0.75 mg/0.5 mL pen injector Inject 0.75 mg into skin once a week (Patient not taking: Reported on 9/11/2024) 2 mL 2    dulaglutide (Trulicity) 0.75 mg/0.5 mL pen injector Inject 0.75 mg under the skin every 7 days. (Patient not taking: Reported on 9/11/2024) 2 mL 2    dulaglutide (Trulicity) 1.5 mg/0.5 mL pen injector injection Inject 1.5 mg under the skin every 7 days. 2 mL 3    dulaglutide (Trulicity) 3 mg/0.5 mL pen injector Inject 4.5 mg under the skin 1 (one) time per week. Once a week on Tuesday      fenofibrate (Tricor) 145 mg tablet Take 1 tablet (145 mg) by mouth once daily.      ferrous gluconate 256 mg (28 mg iron) tablet Take 1 tablet (28 mg) by mouth 1 (one) time per week. Takes one tab once a week      hydroxychloroquine (Plaquenil) 200 mg tablet       icosapent ethyL (Vascepa) 1 gram capsule Take 4 capsules (4 g) by mouth once daily. 360 capsule 3    leucovorin (Wellcovorin) 5 mg tablet Take with a full glass of water every day except the day of methotrexate. 30 tablet 11    levothyroxine (Synthroid) 112 mcg tablet Take 1 tablet (112 mcg) by mouth once daily in the morning. Take before meals. One tablet Monday thru Saturday and one and a half on sunday      lisinopril 10 mg tablet Take by mouth once every 24 hours.      losartan (Cozaar) 100 mg tablet Take 1 tablet (100 mg) by mouth once daily. 90 tablet 3    lubricating eye drops ophthalmic solution 1 drop if needed for dry eyes.      metFORMIN  mg 24 hr tablet Take 2 tablets (1,000 mg) by mouth 2 times a day.      methotrexate (Trexall) 2.5 mg tablet TAKE 5 TABLET BY MOUTH ONE TIME PER WEEK 20 tablet 11    metoprolol succinate XL (Toprol-XL) 200 mg 24 hr tablet Take 1 tablet (200  mg) by mouth once daily. Do not crush or chew.      multivitamin with minerals tablet Take 1 tablet by mouth once daily.      omeprazole OTC (PriLOSEC OTC) 20 mg EC tablet Take 1 tablet (20 mg) by mouth once daily in the morning. Take before meals. Do not crush, chew, or split.      rosuvastatin (Crestor) 10 mg tablet 1 tablet Orally Once a day      traMADol (Ultram) 50 mg tablet TK 1 T PO  BID PRN      traZODone (Desyrel) 50 mg tablet 1 tablet at bedtime Orally Once a day      Trulicity 4.5 mg/0.5 mL pen injector USE AS DIRECTED SUBCUTAENOUS ONCE A WEEK FOR 30 DAYS      venlafaxine XR (Effexor-XR) 37.5 mg 24 hr capsule TAKE 1 CAPSULE BY MOUTH EVERY DAY WITH FOOD       No current facility-administered medications for this visit.      Allergies   Allergen Reactions    Acetaminophen-Codeine Unknown    Etomidate Other and Unknown    Hydrocodone Nausea/vomiting and Unknown    Hydrocodone-Acetaminophen Nausea/vomiting    Percocet [Oxycodone-Acetaminophen] Unknown    Lovastatin Unknown    Naproxen Other and Unknown     Stated did not work for her wrist pain      Visit Vitals  /73 (BP Location: Right arm, Patient Position: Sitting, BP Cuff Size: Adult)   Pulse 79   Wt 81.5 kg (179 lb 9.6 oz)   SpO2 99%   BMI 29.89 kg/m²   Smoking Status Former   BSA 1.93 m²         Rapid 3  Function Score (FN): 0.7  Pain Score (PN) (0-10): 0.5  Patient Global (PTGL) (0-10): 0.5  Rapid3 Score: 1.7  RAPID3 Weighted Score: 0.57  Workup     Component      Latest Ref Rng 11/5/2024   WBC      4.4 - 11.3 x10*3/uL 7.3    nRBC      0.0 - 0.0 /100 WBCs 0.0    RBC      4.00 - 5.20 x10*6/uL 4.58    HEMOGLOBIN      12.0 - 16.0 g/dL 12.6    HEMATOCRIT      36.0 - 46.0 % 41.0    MCV      80 - 100 fL 90    MCH      26.0 - 34.0 pg 27.5    MCHC      32.0 - 36.0 g/dL 30.7 (L)    RED CELL DISTRIBUTION WIDTH      11.5 - 14.5 % 14.8 (H)    Platelets      150 - 450 x10*3/uL 426    Neutrophils %      40.0 - 80.0 % 48.3    Immature Granulocytes %,  Automated      0.0 - 0.9 % 0.3    Lymphocytes %      13.0 - 44.0 % 38.8    Monocytes %      2.0 - 10.0 % 7.7    Eosinophils %      0.0 - 6.0 % 4.2    Basophils %      0.0 - 2.0 % 0.7    Neutrophils Absolute      1.20 - 7.70 x10*3/uL 3.54    Immature Granulocytes Absolute, Automated      0.00 - 0.70 x10*3/uL 0.02    Lymphocytes Absolute      1.20 - 4.80 x10*3/uL 2.84    Monocytes Absolute      0.10 - 1.00 x10*3/uL 0.56    Eosinophils Absolute      0.00 - 0.70 x10*3/uL 0.31    Basophils Absolute      0.00 - 0.10 x10*3/uL 0.05    GLUCOSE      74 - 99 mg/dL 134 (H)    SODIUM      136 - 145 mmol/L 141    POTASSIUM      3.5 - 5.3 mmol/L 4.4    CHLORIDE      98 - 107 mmol/L 101    Bicarbonate      21 - 32 mmol/L 28    Anion Gap      10 - 20 mmol/L 16    Blood Urea Nitrogen      6 - 23 mg/dL 21    Creatinine      0.50 - 1.05 mg/dL 0.67    EGFR      >60 mL/min/1.73m*2 >90    Calcium      8.6 - 10.3 mg/dL 9.9    Albumin      3.4 - 5.0 g/dL 4.3    Alkaline Phosphatase      33 - 136 U/L 68    Total Protein      6.4 - 8.2 g/dL 7.4    AST      9 - 39 U/L 18    Bilirubin Total      0.0 - 1.2 mg/dL 0.2    ALT      7 - 45 U/L 19    Color, Urine      Light-Yellow, Yellow, Dark-Yellow  Light-Yellow    Appearance, Urine      Clear  Clear    Specific Gravity, Urine      1.005 - 1.035  1.017    pH, Urine      5.0, 5.5, 6.0, 6.5, 7.0, 7.5, 8.0  5.5    Protein, Urine      NEGATIVE, 10 (TRACE), 20 (TRACE) mg/dL 30 (1+) !    Glucose, Urine      Normal mg/dL OVER (4+) !    Blood, Urine      NEGATIVE  0.03 (TRACE) !    Ketones, Urine      NEGATIVE mg/dL NEGATIVE    Bilirubin, Urine      NEGATIVE  NEGATIVE    Urobilinogen, Urine      Normal mg/dL Normal    Nitrite, Urine      NEGATIVE  NEGATIVE    Leukocyte Esterase, Urine      NEGATIVE  NEGATIVE    IRON      35 - 150 ug/dL 57    UIBC      110 - 370 ug/dL 429 (H)    TIBC      240 - 445 ug/dL 486 (H)    % Saturation      25 - 45 % 12 (L)    Albumin, Urine Random      Not established mg/L 306.1     Creatinine, Urine Random      20.0 - 320.0 mg/dL 61.1    Albumin/Creatinine Ratio      <30.0 ug/mg Creat 501.0 (H)    WBC, Urine      1-5, NONE /HPF 1-5    RBC, Urine      NONE, 1-2, 3-5 /HPF 1-2    Anti-DNA (DS)      <5.0 IU/mL 2.0    C1Q Complement      10.3 - 20.5 mg/dL 14.2    C3 Complement      87 - 200 mg/dL 212 (H)    C4 Complement      10 - 50 mg/dL 72 (H)    C-Reactive Protein      <1.00 mg/dL 0.67    Creatine Kinase      0 - 215 U/L 89    Sed Rate      0 - 30 mm/h 6    Scan Result Vectra 43 (moderate)   Vitamin D, 25-Hydroxy, Total      30 - 100 ng/mL 60    Interleukin 6      <=2.0 pg/mL <2.0    FERRITIN      8 - 150 ng/mL 41    URIC ACID      2.3 - 6.7 mg/dL 5.7    Thyroid Stimulating Hormone      0.44 - 3.98 mIU/L 1.12    Thyroxine, Free      0.61 - 1.12 ng/dL 1.14 (H)    Extra Tube Hold for add-ons.       Rapid 3  Function Score (FN): 0.7  Pain Score (PN) (0-10): 0.5  Patient Global (PTGL) (0-10): 0.5  Rapid3 Score: 1.7  RAPID3 Weighted Score: 0.57    1. Seropositive rheumatoid arthritis of multiple joints (Multi)  Albumin-Creatinine Ratio, Urine Random    Anti-DNA Antibody, Double-Stranded    C1Q Complement    C3 Complement    C4 Complement    CBC and Auto Differential    Comprehensive Metabolic Panel    C-Reactive Protein    Creatine Kinase    Creatinine, Urine Random    Uric Acid    Urinalysis with Reflex Culture and Microscopic    Vitamin D 25-Hydroxy,Total (for eval of Vitamin D levels)    Sedimentation Rate    Iron and TIBC    Interleukin-6    Ferritin    Vitamin B12      2. Plaque psoriasis        3. Sjogren's syndrome with other organ involvement  Albumin-Creatinine Ratio, Urine Random    Anti-DNA Antibody, Double-Stranded    C1Q Complement    C3 Complement    C4 Complement    CBC and Auto Differential    Comprehensive Metabolic Panel    C-Reactive Protein    Creatine Kinase    Creatinine, Urine Random    Uric Acid    Urinalysis with Reflex Culture and Microscopic    Vitamin D 25-Hydroxy,Total  (for eval of Vitamin D levels)    Sedimentation Rate    Iron and TIBC    Interleukin-6    Ferritin    Vitamin B12      4. Positive JONNY (antinuclear antibody)  Albumin-Creatinine Ratio, Urine Random    Anti-DNA Antibody, Double-Stranded    C1Q Complement    C3 Complement    C4 Complement    CBC and Auto Differential    Comprehensive Metabolic Panel    C-Reactive Protein    Creatine Kinase    Creatinine, Urine Random    Uric Acid    Urinalysis with Reflex Culture and Microscopic    Vitamin D 25-Hydroxy,Total (for eval of Vitamin D levels)    Sedimentation Rate    Iron and TIBC    Interleukin-6    Ferritin    Vitamin B12      5. Hashimoto's disease        6. Vitamin D deficiency  Albumin-Creatinine Ratio, Urine Random    Anti-DNA Antibody, Double-Stranded    C1Q Complement    C3 Complement    C4 Complement    CBC and Auto Differential    Comprehensive Metabolic Panel    C-Reactive Protein    Creatine Kinase    Creatinine, Urine Random    Uric Acid    Urinalysis with Reflex Culture and Microscopic    Vitamin D 25-Hydroxy,Total (for eval of Vitamin D levels)    Sedimentation Rate    Iron and TIBC    Interleukin-6    Ferritin    Vitamin B12      7. Gout with manifestations  Albumin-Creatinine Ratio, Urine Random    Anti-DNA Antibody, Double-Stranded    C1Q Complement    C3 Complement    C4 Complement    CBC and Auto Differential    Comprehensive Metabolic Panel    C-Reactive Protein    Creatine Kinase    Creatinine, Urine Random    Uric Acid    Urinalysis with Reflex Culture and Microscopic    Vitamin D 25-Hydroxy,Total (for eval of Vitamin D levels)    Sedimentation Rate    Iron and TIBC    Interleukin-6    Ferritin    Vitamin B12      8. Iron deficiency  Albumin-Creatinine Ratio, Urine Random    Anti-DNA Antibody, Double-Stranded    C1Q Complement    C3 Complement    C4 Complement    CBC and Auto Differential    Comprehensive Metabolic Panel    C-Reactive Protein    Creatine Kinase    Creatinine, Urine Random    Uric  Acid    Urinalysis with Reflex Culture and Microscopic    Vitamin D 25-Hydroxy,Total (for eval of Vitamin D levels)    Sedimentation Rate    Iron and TIBC    Interleukin-6    Ferritin    Vitamin B12      9. Encounter for long-term (current) use of medications  Albumin-Creatinine Ratio, Urine Random    Anti-DNA Antibody, Double-Stranded    C1Q Complement    C3 Complement    C4 Complement    CBC and Auto Differential    Comprehensive Metabolic Panel    C-Reactive Protein    Creatine Kinase    Creatinine, Urine Random    Uric Acid    Urinalysis with Reflex Culture and Microscopic    Vitamin D 25-Hydroxy,Total (for eval of Vitamin D levels)    Sedimentation Rate    Iron and TIBC    Interleukin-6    Ferritin    Vitamin B12            Since last appt, adherent and tolerating HCQ, methotrexate 12.5 mg q wk  Mechanical fall  Abscess tooth, tx with Zpack, then extracted tooth. Told is all clear of infection.   Denies any recent or current infection.  Not on any NSAIDs or glucocorticoids.  ROS+ for night sweats without fever, maybe scalp tenderness, dey eyes- on eye drops, diarrhea and lower abdomen issues with iron, rashes, joint pain L hip R knee L ring finger locks   AM stiffness a couple of hours, weakness shoulders, tingling hands that wakes her up, depression.   Rapid 3 consistent with at/near remission.  Labs reviewed  D/w pt tx options  Continue regimen  Chair yoga  Advised of possible side effects and importance of monitoring.   All questions answered.  Patient to follow up with primary care provider regarding all other medical issues not addressed today and for medical chart updating.     Mary Bee MD      Patient Care Team:  ANGEL Mayfield-CNP as PCP - General (Family Medicine)  Mary Bee MD as Consulting Physician (Rheumatology)

## 2024-11-29 PROCEDURE — RXMED WILLOW AMBULATORY MEDICATION CHARGE

## 2024-12-04 ENCOUNTER — PHARMACY VISIT (OUTPATIENT)
Dept: PHARMACY | Facility: CLINIC | Age: 64
End: 2024-12-04
Payer: MEDICAID

## 2024-12-25 PROCEDURE — RXMED WILLOW AMBULATORY MEDICATION CHARGE

## 2024-12-30 ENCOUNTER — PHARMACY VISIT (OUTPATIENT)
Dept: PHARMACY | Facility: CLINIC | Age: 64
End: 2024-12-30
Payer: MEDICAID

## 2025-01-12 DIAGNOSIS — E83.52 HYPERCALCEMIA: ICD-10-CM

## 2025-01-13 RX ORDER — LOSARTAN POTASSIUM 100 MG/1
100 TABLET ORAL DAILY
Qty: 90 TABLET | Refills: 3 | Status: SHIPPED | OUTPATIENT
Start: 2025-01-13

## 2025-01-24 PROCEDURE — RXMED WILLOW AMBULATORY MEDICATION CHARGE

## 2025-01-27 ENCOUNTER — PHARMACY VISIT (OUTPATIENT)
Dept: PHARMACY | Facility: CLINIC | Age: 65
End: 2025-01-27
Payer: MEDICAID

## 2025-02-17 DIAGNOSIS — M10.9 GOUT WITH MANIFESTATIONS: Primary | ICD-10-CM

## 2025-02-18 RX ORDER — COLCHICINE 0.6 MG/1
TABLET ORAL
Qty: 60 TABLET | Refills: 3 | Status: SHIPPED | OUTPATIENT
Start: 2025-02-18

## 2025-02-21 ENCOUNTER — PHARMACY VISIT (OUTPATIENT)
Dept: PHARMACY | Facility: CLINIC | Age: 65
End: 2025-02-21
Payer: MEDICAID

## 2025-02-21 PROCEDURE — RXMED WILLOW AMBULATORY MEDICATION CHARGE

## 2025-03-06 LAB
25(OH)D3+25(OH)D2 SERPL-MCNC: 61 NG/ML (ref 30–100)
ALBUMIN SERPL-MCNC: 4.7 G/DL (ref 3.6–5.1)
ALP SERPL-CCNC: 63 U/L (ref 37–153)
ALT SERPL-CCNC: 32 U/L (ref 6–29)
ANION GAP SERPL CALCULATED.4IONS-SCNC: 12 MMOL/L (CALC) (ref 7–17)
APPEARANCE UR: CLEAR
AST SERPL-CCNC: 28 U/L (ref 10–35)
BACTERIA #/AREA URNS HPF: ABNORMAL /HPF
BACTERIA UR CULT: ABNORMAL
BASOPHILS # BLD AUTO: 42 CELLS/UL (ref 0–200)
BASOPHILS NFR BLD AUTO: 0.5 %
BILIRUB SERPL-MCNC: 0.2 MG/DL (ref 0.2–1.2)
BILIRUB UR QL STRIP: NEGATIVE
BUN SERPL-MCNC: 18 MG/DL (ref 7–25)
C1Q SERPL-MCNC: NORMAL UG/ML
C3 SERPL-MCNC: 204 MG/DL (ref 83–193)
C4 SERPL-MCNC: 46 MG/DL (ref 15–57)
CALCIUM SERPL-MCNC: 10.1 MG/DL (ref 8.6–10.4)
CHLORIDE SERPL-SCNC: 102 MMOL/L (ref 98–110)
CK SERPL-CCNC: 101 U/L (ref 20–243)
CO2 SERPL-SCNC: 25 MMOL/L (ref 20–32)
COLOR UR: YELLOW
CREAT SERPL-MCNC: 0.7 MG/DL (ref 0.5–1.05)
CRP SERPL-MCNC: <3 MG/L
DSDNA AB SER-ACNC: 1 IU/ML
EGFRCR SERPLBLD CKD-EPI 2021: 97 ML/MIN/1.73M2
EOSINOPHIL # BLD AUTO: 328 CELLS/UL (ref 15–500)
EOSINOPHIL NFR BLD AUTO: 3.9 %
ERYTHROCYTE [DISTWIDTH] IN BLOOD BY AUTOMATED COUNT: 14.3 % (ref 11–15)
ERYTHROCYTE [SEDIMENTATION RATE] IN BLOOD BY WESTERGREN METHOD: 2 MM/H
FERRITIN SERPL-MCNC: 15 NG/ML (ref 16–288)
GLUCOSE SERPL-MCNC: 117 MG/DL (ref 65–139)
GLUCOSE UR QL STRIP: ABNORMAL
HCT VFR BLD AUTO: 42.4 % (ref 35–45)
HGB BLD-MCNC: 13.6 G/DL (ref 11.7–15.5)
HGB UR QL STRIP: NEGATIVE
HYALINE CASTS #/AREA URNS LPF: ABNORMAL /LPF
IL6 SERPL-MCNC: NORMAL PG/ML
IRON SATN MFR SERPL: 14 % (CALC) (ref 16–45)
IRON SERPL-MCNC: 68 MCG/DL (ref 45–160)
KETONES UR QL STRIP: NEGATIVE
LEUKOCYTE ESTERASE UR QL STRIP: NEGATIVE
LYMPHOCYTES # BLD AUTO: 3074 CELLS/UL (ref 850–3900)
LYMPHOCYTES NFR BLD AUTO: 36.6 %
MCH RBC QN AUTO: 27.3 PG (ref 27–33)
MCHC RBC AUTO-ENTMCNC: 32.1 G/DL (ref 32–36)
MCV RBC AUTO: 85 FL (ref 80–100)
MONOCYTES # BLD AUTO: 722 CELLS/UL (ref 200–950)
MONOCYTES NFR BLD AUTO: 8.6 %
NEUTROPHILS # BLD AUTO: 4234 CELLS/UL (ref 1500–7800)
NEUTROPHILS NFR BLD AUTO: 50.4 %
NITRITE UR QL STRIP: NEGATIVE
PH UR STRIP: 5.5 [PH] (ref 5–8)
PLATELET # BLD AUTO: 416 THOUSAND/UL (ref 140–400)
PMV BLD REES-ECKER: 9.9 FL (ref 7.5–12.5)
POTASSIUM SERPL-SCNC: 4.4 MMOL/L (ref 3.5–5.3)
PROT SERPL-MCNC: 7.9 G/DL (ref 6.1–8.1)
PROT UR QL STRIP: ABNORMAL
RBC # BLD AUTO: 4.99 MILLION/UL (ref 3.8–5.1)
RBC #/AREA URNS HPF: ABNORMAL /HPF
SERVICE CMNT-IMP: ABNORMAL
SODIUM SERPL-SCNC: 139 MMOL/L (ref 135–146)
SP GR UR STRIP: 1.02 (ref 1–1.03)
SQUAMOUS #/AREA URNS HPF: ABNORMAL /HPF
TIBC SERPL-MCNC: 475 MCG/DL (CALC) (ref 250–450)
URATE SERPL-MCNC: 5.8 MG/DL (ref 2.5–7)
VIT B12 SERPL-MCNC: >2000 PG/ML (ref 200–1100)
WBC # BLD AUTO: 8.4 THOUSAND/UL (ref 3.8–10.8)
WBC #/AREA URNS HPF: ABNORMAL /HPF

## 2025-03-13 LAB
25(OH)D3+25(OH)D2 SERPL-MCNC: 61 NG/ML (ref 30–100)
ALBUMIN SERPL-MCNC: 4.7 G/DL (ref 3.6–5.1)
ALP SERPL-CCNC: 63 U/L (ref 37–153)
ALT SERPL-CCNC: 32 U/L (ref 6–29)
ANION GAP SERPL CALCULATED.4IONS-SCNC: 12 MMOL/L (CALC) (ref 7–17)
APPEARANCE UR: CLEAR
AST SERPL-CCNC: 28 U/L (ref 10–35)
BACTERIA #/AREA URNS HPF: ABNORMAL /HPF
BACTERIA UR CULT: ABNORMAL
BASOPHILS # BLD AUTO: 42 CELLS/UL (ref 0–200)
BASOPHILS NFR BLD AUTO: 0.5 %
BILIRUB SERPL-MCNC: 0.2 MG/DL (ref 0.2–1.2)
BILIRUB UR QL STRIP: NEGATIVE
BUN SERPL-MCNC: 18 MG/DL (ref 7–25)
C1Q SERPL-MCNC: 6.2 MG/DL (ref 5–8.6)
C3 SERPL-MCNC: 204 MG/DL (ref 83–193)
C4 SERPL-MCNC: 46 MG/DL (ref 15–57)
CALCIUM SERPL-MCNC: 10.1 MG/DL (ref 8.6–10.4)
CHLORIDE SERPL-SCNC: 102 MMOL/L (ref 98–110)
CK SERPL-CCNC: 101 U/L (ref 20–243)
CO2 SERPL-SCNC: 25 MMOL/L (ref 20–32)
COLOR UR: YELLOW
CREAT SERPL-MCNC: 0.7 MG/DL (ref 0.5–1.05)
CRP SERPL-MCNC: <3 MG/L
DSDNA AB SER-ACNC: 1 IU/ML
EGFRCR SERPLBLD CKD-EPI 2021: 97 ML/MIN/1.73M2
EOSINOPHIL # BLD AUTO: 328 CELLS/UL (ref 15–500)
EOSINOPHIL NFR BLD AUTO: 3.9 %
ERYTHROCYTE [DISTWIDTH] IN BLOOD BY AUTOMATED COUNT: 14.3 % (ref 11–15)
ERYTHROCYTE [SEDIMENTATION RATE] IN BLOOD BY WESTERGREN METHOD: 2 MM/H
FERRITIN SERPL-MCNC: 15 NG/ML (ref 16–288)
GLUCOSE SERPL-MCNC: 117 MG/DL (ref 65–139)
GLUCOSE UR QL STRIP: ABNORMAL
HCT VFR BLD AUTO: 42.4 % (ref 35–45)
HGB BLD-MCNC: 13.6 G/DL (ref 11.7–15.5)
HGB UR QL STRIP: NEGATIVE
HYALINE CASTS #/AREA URNS LPF: ABNORMAL /LPF
IL6 SERPL-MCNC: <1.4 PG/ML
IRON SATN MFR SERPL: 14 % (CALC) (ref 16–45)
IRON SERPL-MCNC: 68 MCG/DL (ref 45–160)
KETONES UR QL STRIP: NEGATIVE
LEUKOCYTE ESTERASE UR QL STRIP: NEGATIVE
LYMPHOCYTES # BLD AUTO: 3074 CELLS/UL (ref 850–3900)
LYMPHOCYTES NFR BLD AUTO: 36.6 %
MCH RBC QN AUTO: 27.3 PG (ref 27–33)
MCHC RBC AUTO-ENTMCNC: 32.1 G/DL (ref 32–36)
MCV RBC AUTO: 85 FL (ref 80–100)
MONOCYTES # BLD AUTO: 722 CELLS/UL (ref 200–950)
MONOCYTES NFR BLD AUTO: 8.6 %
NEUTROPHILS # BLD AUTO: 4234 CELLS/UL (ref 1500–7800)
NEUTROPHILS NFR BLD AUTO: 50.4 %
NITRITE UR QL STRIP: NEGATIVE
PH UR STRIP: 5.5 [PH] (ref 5–8)
PLATELET # BLD AUTO: 416 THOUSAND/UL (ref 140–400)
PMV BLD REES-ECKER: 9.9 FL (ref 7.5–12.5)
POTASSIUM SERPL-SCNC: 4.4 MMOL/L (ref 3.5–5.3)
PROT SERPL-MCNC: 7.9 G/DL (ref 6.1–8.1)
PROT UR QL STRIP: ABNORMAL
RBC # BLD AUTO: 4.99 MILLION/UL (ref 3.8–5.1)
RBC #/AREA URNS HPF: ABNORMAL /HPF
SERVICE CMNT-IMP: ABNORMAL
SODIUM SERPL-SCNC: 139 MMOL/L (ref 135–146)
SP GR UR STRIP: 1.02 (ref 1–1.03)
SQUAMOUS #/AREA URNS HPF: ABNORMAL /HPF
TIBC SERPL-MCNC: 475 MCG/DL (CALC) (ref 250–450)
URATE SERPL-MCNC: 5.8 MG/DL (ref 2.5–7)
VIT B12 SERPL-MCNC: >2000 PG/ML (ref 200–1100)
WBC # BLD AUTO: 8.4 THOUSAND/UL (ref 3.8–10.8)
WBC #/AREA URNS HPF: ABNORMAL /HPF

## 2025-03-17 PROCEDURE — RXMED WILLOW AMBULATORY MEDICATION CHARGE

## 2025-03-21 ENCOUNTER — PHARMACY VISIT (OUTPATIENT)
Dept: PHARMACY | Facility: CLINIC | Age: 65
End: 2025-03-21
Payer: MEDICAID

## 2025-03-26 ENCOUNTER — OFFICE VISIT (OUTPATIENT)
Dept: RHEUMATOLOGY | Facility: CLINIC | Age: 65
End: 2025-03-26
Payer: MEDICAID

## 2025-03-26 VITALS
WEIGHT: 185 LBS | RESPIRATION RATE: 18 BRPM | TEMPERATURE: 96.5 F | HEART RATE: 77 BPM | SYSTOLIC BLOOD PRESSURE: 149 MMHG | DIASTOLIC BLOOD PRESSURE: 84 MMHG | OXYGEN SATURATION: 97 % | HEIGHT: 65 IN | BODY MASS INDEX: 30.82 KG/M2

## 2025-03-26 DIAGNOSIS — M10.9 GOUT WITH MANIFESTATIONS: ICD-10-CM

## 2025-03-26 DIAGNOSIS — M35.09 SJOGREN'S SYNDROME WITH OTHER ORGAN INVOLVEMENT: ICD-10-CM

## 2025-03-26 DIAGNOSIS — E06.3 HASHIMOTO'S DISEASE: ICD-10-CM

## 2025-03-26 DIAGNOSIS — R76.8 POSITIVE ANA (ANTINUCLEAR ANTIBODY): ICD-10-CM

## 2025-03-26 DIAGNOSIS — M05.79 SEROPOSITIVE RHEUMATOID ARTHRITIS OF MULTIPLE JOINTS (MULTI): Primary | ICD-10-CM

## 2025-03-26 DIAGNOSIS — Z79.899 ENCOUNTER FOR LONG-TERM (CURRENT) USE OF MEDICATIONS: ICD-10-CM

## 2025-03-26 DIAGNOSIS — E55.9 VITAMIN D DEFICIENCY: ICD-10-CM

## 2025-03-26 DIAGNOSIS — L40.0 PLAQUE PSORIASIS: ICD-10-CM

## 2025-03-26 DIAGNOSIS — E61.1 IRON DEFICIENCY: ICD-10-CM

## 2025-03-26 PROCEDURE — 4010F ACE/ARB THERAPY RXD/TAKEN: CPT | Performed by: INTERNAL MEDICINE

## 2025-03-26 PROCEDURE — 3077F SYST BP >= 140 MM HG: CPT | Performed by: INTERNAL MEDICINE

## 2025-03-26 PROCEDURE — 3008F BODY MASS INDEX DOCD: CPT | Performed by: INTERNAL MEDICINE

## 2025-03-26 PROCEDURE — 99214 OFFICE O/P EST MOD 30 MIN: CPT | Performed by: INTERNAL MEDICINE

## 2025-03-26 PROCEDURE — 3079F DIAST BP 80-89 MM HG: CPT | Performed by: INTERNAL MEDICINE

## 2025-03-26 RX ORDER — METHOTREXATE 2.5 MG/1
17.5 TABLET ORAL WEEKLY
Qty: 72 TABLET | Refills: 3 | Status: SHIPPED | OUTPATIENT
Start: 2025-03-26

## 2025-03-26 ASSESSMENT — ROUTINE ASSESSMENT OF PATIENT INDEX DATA (RAPID3)
DRESS_YOURSELF: WITHOUT ANY DIFFICULTY
PARTIPATE_RECREATIONAL_ACTIVITIES: WITH SOME DIFFICULTY
WALK_FLAT_GROUND: WITHOUT ANY DIFFICULTY
FN_SCORE: 1.7
TURN_FAUCETS_OFF: WITHOUT ANY DIFFICULTY
IN_OUT_BED: WITH SOME DIFFICULTY
ON A SCALE OF ONE TO TEN, HOW MUCH PAIN HAVE YOU HAD BECAUSE OF YOUR CONDITION OVER THE PAST WEEK?: 4
SEVERITY_SCORE: 0
WASH_DRY_BODY: WITHOUT ANY DIFFICULTY
GOOD_NIGHTS_SLEEP: WITH SOME DIFFICULTY
WALK_KILOMETERS: WITH SOME DIFFICULTY
SEVERITY_SCORE: MODERATE SEVERITY (MS)
ON A SCALE OF ONE TO TEN, CONSIDERING ALL THE WAYS IN WHICH ILLNESS AND HEALTH CONDITIONS MAY AFFECT YOU AT THIS TIME, PLEASE INDICATE BELOW HOW YOU ARE DOING:: 3
IN_OUT_TRANSPORT: WITH SOME DIFFICULTY
LIFT_CUP_TO_MOUTH: WITHOUT ANY DIFFICULTY
FEELINGS_ANXIETY_NERVOUS: WITH SOME DIFFICULTY
ON A SCALE OF ONE TO TEN, HOW MUCH PAIN HAVE YOU HAD BECAUSE OF YOUR CONDITION OVER THE PAST WEEK?: 4
PICK_CLOTHES_OFF_FLOOR: WITH SOME DIFFICULTY
ON A SCALE OF ONE TO TEN, CONSIDERING ALL THE WAYS IN WHICH ILLNESS AND HEALTH CONDITIONS MAY AFFECT YOU AT THIS TIME, PLEASE INDICATE BELOW HOW YOU ARE DOING:: 3
SUM OF QUESTIONS A TO J: 5
WEIGHTED_TOTAL_SCORE: 2.9
TOTAL RAPID3 SCORE: 8.7
FEELINGS_DEPRESSION: WITH SOME DIFFICULTY

## 2025-03-26 ASSESSMENT — PATIENT HEALTH QUESTIONNAIRE - PHQ9
10. IF YOU CHECKED OFF ANY PROBLEMS, HOW DIFFICULT HAVE THESE PROBLEMS MADE IT FOR YOU TO DO YOUR WORK, TAKE CARE OF THINGS AT HOME, OR GET ALONG WITH OTHER PEOPLE: SOMEWHAT DIFFICULT
SUM OF ALL RESPONSES TO PHQ9 QUESTIONS 1 AND 2: 2
1. LITTLE INTEREST OR PLEASURE IN DOING THINGS: SEVERAL DAYS
2. FEELING DOWN, DEPRESSED OR HOPELESS: SEVERAL DAYS

## 2025-03-26 ASSESSMENT — PAIN SCALES - GENERAL: PAINLEVEL_OUTOF10: 4

## 2025-03-26 NOTE — PROGRESS NOTES
[facility]/  Rheumatology  [address], [address]  [place] [address]  Phone: [phone]  Fax: [phone]    Rheumatology Follow Up Visit 03/26/2025        Fouzia Sanchez is a 64 y.o. female here for follow up.     Last Visit: 7/12/24    HPI    Workup including XR and d/w pt. Soft tissue swelling, periarticular osteopenia, chronic achilles enthesopathy, no erosions. Elevated WBC, PLT, CRP. High Vectra, anti CCP and Anti TPO.  Chief complaint: joint swelling and pain  Since Feb 1, 2023  Sudden onset  Intermittent course  Precipitating factors: slight aching  Associated symptoms: swelling, stiffness, pain  Better: heat  Worse: cold  Occupation:   ROS+   Fatigue  Hair loss  Swelling of feet only when in pain  Abd pain/GERD  AM stiffness, sometimes all day  Joint pain, swelling  Muscle aches and weakness- L leg  Rashes- Psoriasis  Seasonal allergies  Frequent infections as a childp- strep  Pregnancy loss in 1st trimester and 1 stillborn  Numbness/tingling tall finger R hand  Thyroid dz- Hashimoto's  Diabetes  Post menopause  Depression/ Anxiety  Sleep disturbance    Currently tenderness of bilateral shoulders, L elbow, R hip, R 2nd MCP  Swelling of L elbow, soy ankles, R 2nd MCP  AM stiffness sometimes all day, sometimes 2 hr or less.  Patient went to urgent care and dx with gout and tx with colchine. Rheumatology panel done and found to have + JONNY, + SSB, and + RF.  Psoriasis since lost her baby.       FHx: Mother- Crohn's, psoriasis  Brother- Psoriasis, on Humira    Previous Tx  Naproxen- somewhat helpful  Ibuprofen- somewhat helpful  Aspirin- very helpful but takes 4 tablets 4 times a dy for flares  Steroids- very helpful  Colchicine- very helpful  Clobetasol and other creams- no significant help  Betamethasone- some help    Health Maintenance:  DXA- T-0.8 (hip; 12/23); FRAX 7.3%/0.4%  Malignancy Hx- none  Immunization History   Administered Date(s) Administered     Flu vaccine (IIV4), preservative free *Check age/dose* 2016, 2020    Flu vaccine, quadrivalent, no egg protein, age 6 month or greater (FLUCELVAX) 2023    Flu vaccine, trivalent, preservative free, age 6 months and greater (Fluarix/Fluzone/Flulaval) 2020, 10/26/2021, 10/19/2022    Influenza Whole 10/22/2018    Moderna SARS-CoV-2 Vaccination 2021, 2021    Pneumococcal polysaccharide vaccine, 23-valent, age 2 years and older (PNEUMOVAX 23) 10/26/2021    Tdap vaccine, age 7 year and older (BOOSTRIX, ADACEL) 2022       Past Medical History:   Diagnosis Date    Anemia     does not have    CAD (coronary artery disease)     Chronic tension headache     resolved 2020    COVID         Depression     Diabetes mellitus (Multi)     non insulin depndent    Hashimoto's disease     Hepatic steatosis     Hypercalcemia     resolved 2020    Hyperlipidemia     Hypertension     Hypertensive retinopathy     with preglaucoma- Dr Zhao    Iron deficiency anemia     does not have    Psoriasis     Thoracic compression fracture (Multi)     T11 from traumatic fall down stairs    Uterine fibroid       Past Surgical History:   Procedure Laterality Date     SECTION, CLASSIC      OOPHORECTOMY Right     OVARIAN CYST REMOVAL Right     SHOULDER OPEN ROTATOR CUFF REPAIR Left       Current Outpatient Medications   Medication Sig Dispense Refill    amLODIPine (Norvasc) 10 mg tablet Take 1 tablet (10 mg) by mouth once daily.      B complex-vitamin C-folic acid (Nephro-Jael Rx) 1- mg-mg-mcg tablet Take 1 tablet by mouth once daily with breakfast.      cholecalciferol (Vitamin D-3) 50 MCG ( UT) tablet 1 capsule Orally      clobetasol (Temovate) 0.05 % ointment APPLY TOPICALLY TO THE AFFECTED AREA TWICE DAILY      colchicine 0.6 mg tablet TAKE 1 TABLET(0.6 MG) BY MOUTH TWICE DAILY 60 tablet 3    cyanocobalamin (Vitamin B-12) 1,000 mcg tablet Take 1 tablet (1,000 mcg) by mouth  once daily.      dapagliflozin propanediol (Farxiga) 10 mg Take 1 tablet (10 mg) by mouth once daily.      diclofenac sodium (Voltaren) 1 % gel Apply 4.5 inches (4 g) topically 4 times a day. 100 g 3    dulaglutide (Trulicity) 0.75 mg/0.5 mL pen injector Inject 0.75 mg into skin once a week (Patient not taking: Reported on 9/11/2024) 2 mL 2    dulaglutide (Trulicity) 0.75 mg/0.5 mL pen injector Inject 0.75 mg under the skin every 7 days. (Patient not taking: Reported on 9/11/2024) 2 mL 2    dulaglutide (Trulicity) 1.5 mg/0.5 mL pen injector injection Inject 1.5 mg under the skin every 7 days. 2 mL 3    dulaglutide (Trulicity) 3 mg/0.5 mL pen injector Inject 4.5 mg under the skin 1 (one) time per week. Once a week on Tuesday      fenofibrate (Tricor) 145 mg tablet Take 1 tablet (145 mg) by mouth once daily.      ferrous gluconate 256 mg (28 mg iron) tablet Take 1 tablet (28 mg) by mouth 1 (one) time per week. Takes one tab once a week      hydroxychloroquine (Plaquenil) 200 mg tablet Take 1 tablet (200 mg) by mouth 2 times a day. 180 tablet 3    icosapent ethyL (Vascepa) 1 gram capsule Take 4 capsules (4 g) by mouth once daily. 360 capsule 3    leucovorin (Wellcovorin) 5 mg tablet Take 1 tablet (5 mg total) by mouth once daily.  Take with a full glass of water every day except the day of methotrexate. 90 tablet 3    levothyroxine (Synthroid) 112 mcg tablet Take 1 tablet (112 mcg) by mouth once daily in the morning. Take before meals. One tablet Monday thru Saturday and one and a half on sunday      lisinopril 10 mg tablet Take by mouth once every 24 hours.      losartan (Cozaar) 100 mg tablet TAKE 1 TABLET(100 MG) BY MOUTH EVERY DAY 90 tablet 3    lubricating eye drops ophthalmic solution 1 drop if needed for dry eyes.      metFORMIN  mg 24 hr tablet Take 2 tablets (1,000 mg) by mouth 2 times a day.      methotrexate (Trexall) 2.5 mg tablet Take 5 tablets (12.5 mg total) by mouth 1 (one) time per week.  Follow  "directions carefully, and ask to explain any part you do not understand. Take exactly as directed. 60 tablet 3    metoprolol succinate XL (Toprol-XL) 200 mg 24 hr tablet Take 1 tablet (200 mg) by mouth once daily. Do not crush or chew.      multivitamin with minerals tablet Take 1 tablet by mouth once daily.      omeprazole OTC (PriLOSEC OTC) 20 mg EC tablet Take 1 tablet (20 mg) by mouth once daily in the morning. Take before meals. Do not crush, chew, or split.      rosuvastatin (Crestor) 10 mg tablet 1 tablet Orally Once a day      traMADol (Ultram) 50 mg tablet TK 1 T PO  BID PRN      traZODone (Desyrel) 50 mg tablet 1 tablet at bedtime Orally Once a day      Trulicity 4.5 mg/0.5 mL pen injector USE AS DIRECTED SUBCUTAENOUS ONCE A WEEK FOR 30 DAYS      venlafaxine XR (Effexor-XR) 37.5 mg 24 hr capsule TAKE 1 CAPSULE BY MOUTH EVERY DAY WITH FOOD       No current facility-administered medications for this visit.      Allergies   Allergen Reactions    Acetaminophen-Codeine Unknown    Etomidate Other and Unknown    Hydrocodone Nausea/vomiting and Unknown    Hydrocodone-Acetaminophen Nausea/vomiting    Percocet [Oxycodone-Acetaminophen] Unknown    Lovastatin Unknown    Naproxen Other and Unknown     Stated did not work for her wrist pain      Visit Vitals  /84 (BP Location: Left arm, Patient Position: Sitting, BP Cuff Size: Adult long)   Pulse 77   Temp 35.8 °C (96.5 °F) (Temporal)   Resp 18   Ht 1.651 m (5' 5\")   Wt 83.9 kg (185 lb)   SpO2 97%   BMI 30.79 kg/m²   OB Status Postmenopausal   Smoking Status Former   BSA 1.96 m²         Rapid 3  Function Score (FN): 1.7  Pain Score (PN) (0-10): 4  Patient Global (PTGL) (0-10): 3  Rapid3 Score: 8.7  RAPID3 Weighted Score: 2.9  Workup     Component      Latest Ref Rng 3/5/2025   WHITE BLOOD CELL COUNT      3.8 - 10.8 Thousand/uL 8.4    RED BLOOD CELL COUNT      3.80 - 5.10 Million/uL 4.99    HEMOGLOBIN      11.7 - 15.5 g/dL 13.6    HEMATOCRIT      35.0 - 45.0 % 42.4 "    MCV      80.0 - 100.0 fL 85.0    MCH      27.0 - 33.0 pg 27.3    MCHC      32.0 - 36.0 g/dL 32.1    RDW      11.0 - 15.0 % 14.3    PLATELET COUNT      140 - 400 Thousand/uL 416 (H)    MPV      7.5 - 12.5 fL 9.9    ABSOLUTE NEUTROPHILS      1,500 - 7,800 cells/uL 4,234    ABSOLUTE LYMPHOCYTES      850 - 3,900 cells/uL 3,074    ABSOLUTE MONOCYTES      200 - 950 cells/uL 722    ABSOLUTE EOSINOPHILS      15 - 500 cells/uL 328    ABSOLUTE BASOPHILS      0 - 200 cells/uL 42    NEUTROPHILS      % 50.4    LYMPHOCYTES      % 36.6    MONOCYTES      % 8.6    EOSINOPHILS      % 3.9    BASOPHILS      % 0.5    COLOR      YELLOW  YELLOW    APPEARANCE      CLEAR  CLEAR    SPECIFIC GRAVITY      1.001 - 1.035  1.024    PH      5.0 - 8.0  5.5    GLUCOSE      NEGATIVE  3+ !    BILIRUBIN      NEGATIVE  NEGATIVE    KETONES      NEGATIVE  NEGATIVE    OCCULT BLOOD      NEGATIVE  NEGATIVE    PROTEIN      NEGATIVE  1+ !    NITRITE      NEGATIVE  NEGATIVE    LEUKOCYTE ESTERASE      NEGATIVE  NEGATIVE    WBC      < OR = 5 /HPF NONE SEEN    RBC      < OR = 2 /HPF NONE SEEN    SQUAMOUS EPITHELIAL CELLS      < OR = 5 /HPF NONE SEEN    BACTERIA      NONE SEEN /HPF NONE SEEN    HYALINE CAST      NONE SEEN /LPF NONE SEEN    NOTE --    CULTURE, URINE, ROUTINE --    GLUCOSE      65 - 139 mg/dL 117    UREA NITROGEN (BUN)      7 - 25 mg/dL 18    CREATININE      0.50 - 1.05 mg/dL 0.70    EGFR      > OR = 60 mL/min/1.73m2 97    SODIUM      135 - 146 mmol/L 139    POTASSIUM      3.5 - 5.3 mmol/L 4.4    CHLORIDE      98 - 110 mmol/L 102    CARBON DIOXIDE      20 - 32 mmol/L 25    ELECTROLYTE BALANCE      7 - 17 mmol/L (calc) 12    CALCIUM      8.6 - 10.4 mg/dL 10.1    PROTEIN, TOTAL      6.1 - 8.1 g/dL 7.9    ALBUMIN      3.6 - 5.1 g/dL 4.7    BILIRUBIN, TOTAL      0.2 - 1.2 mg/dL 0.2    ALKALINE PHOSPHATASE      37 - 153 U/L 63    AST      10 - 35 U/L 28    ALT      6 - 29 U/L 32 (H)    IRON, TOTAL      45 - 160 mcg/dL 68    IRON BINDING CAPACITY       250 - 450 mcg/dL (calc) 475 (H)    % SATURATION      16 - 45 % (calc) 14 (L)    URIC ACID      2.5 - 7.0 mg/dL 5.8    CREATINE KINASE, TOTAL      20 - 243 U/L 101    COMPLEMENT COMPONENT C1Q      5.0 - 8.6 mg/dL 6.2    SED RATE BY MODIFIED WESTERGREN      < OR = 30 mm/h 2    DNA (DS) ANTIBODY      IU/mL 1    COMPLEMENT COMPONENT C3C      83 - 193 mg/dL 204 (H)    COMPLEMENT COMPONENT C4C      15 - 57 mg/dL 46    C-REACTIVE PROTEIN      <8.0 mg/L <3.0    INTERLEUKIN 6 (IL 6), SERUM      <5.00 pg/mL <1.40    FERRITIN      16 - 288 ng/mL 15 (L)    VITAMIN B12      200 - 1100 pg/mL >2000 (H)    VITAMIN D,25-OH,TOTAL,IA      30 - 100 ng/mL 61         Rapid 3  Function Score (FN): 1.7  Pain Score (PN) (0-10): 4  Patient Global (PTGL) (0-10): 3  Rapid3 Score: 8.7  RAPID3 Weighted Score: 2.9        1. Seropositive rheumatoid arthritis of multiple joints (Multi)        2. Plaque psoriasis        3. Sjogren's syndrome with other organ involvement        4. Positive JONNY (antinuclear antibody)        5. Hashimoto's disease        6. Gout with manifestations        7. Iron deficiency        8. Vitamin D deficiency        9. Encounter for long-term (current) use of medications                Previously, adherent and tolerating HCQ, methotrexate 12.5 mg q wk  Mechanical fall  Abscess tooth, tx with Zpack, then extracted tooth. Told is all clear of infection.   Denies any recent or current infection.  Not on any NSAIDs or glucocorticoids.  ROS+ for night sweats without fever, maybe scalp tenderness, dey eyes- on eye drops, diarrhea and lower abdomen issues with iron, rashes, joint pain L hip R knee L ring finger locks   AM stiffness a couple of hours, weakness shoulders, tingling hands that wakes her up, depression.   Rapid 3 consistent with at/near remission.  Labs reviewed  D/w pt tx options  Continue regimen  Chair yoga  Advised of possible side effects and importance of monitoring.   All questions answered.  Patient to follow  up with primary care provider regarding all other medical issues not addressed today and for medical chart updating.         Since last appt, adherent and tolerating  mg daily, methotrexate 12.5 mg q wk.  On iron with vit C 3/wk.  Psoriasis much better than originally with tx but still with some small multiple patches legs and large patch over elbows.  Not hurting all day or swollen all they time like before,   Past couple of weeks swelling and pain including feet, L hip.  L finger triggering  Feels like muscles contracted/tight at night.  AM stiffness maybe a couple of hours  Denies any recent or current infection.  Low grade L temporal sens H/A temple for the past coule of weeks without   Not on any NSAIDs or glucocorticoids.  ROS+ for drenching night sweats without fever, fatigue, sicca, BARBOZA, rashes, joint pain/swelling/stiffness, back pain, numbness/tingling when sleeping, depression.  Rapid 3 consistent with moderate severity.  Labs reviewed  D/w pt tx options and decided on   Increase methotrexate   Iron suppl  Advised of possible side effects and importance of monitoring.   All questions answered.  Patient to follow up with primary care provider regarding all other medical issues not addressed today and for medical chart updating.     Mary Bee MD      Patient Care Team:  ANGEL Mayfield-CNP as PCP - General (Family Medicine)  Mary Bee MD as Consulting Physician (Rheumatology)

## 2025-04-14 ENCOUNTER — PHARMACY VISIT (OUTPATIENT)
Dept: PHARMACY | Facility: CLINIC | Age: 65
End: 2025-04-14
Payer: MEDICAID

## 2025-04-14 PROCEDURE — RXMED WILLOW AMBULATORY MEDICATION CHARGE

## 2025-06-13 ENCOUNTER — PHARMACY VISIT (OUTPATIENT)
Dept: PHARMACY | Facility: CLINIC | Age: 65
End: 2025-06-13
Payer: MEDICAID

## 2025-06-13 PROCEDURE — RXMED WILLOW AMBULATORY MEDICATION CHARGE

## 2025-07-13 PROCEDURE — RXMED WILLOW AMBULATORY MEDICATION CHARGE

## 2025-07-14 ENCOUNTER — PHARMACY VISIT (OUTPATIENT)
Dept: PHARMACY | Facility: CLINIC | Age: 65
End: 2025-07-14
Payer: MEDICAID

## 2025-07-16 ENCOUNTER — APPOINTMENT (OUTPATIENT)
Dept: OPHTHALMOLOGY | Facility: CLINIC | Age: 65
End: 2025-07-16
Payer: MEDICAID

## 2025-07-16 ENCOUNTER — OFFICE VISIT (OUTPATIENT)
Dept: OPHTHALMOLOGY | Facility: CLINIC | Age: 65
End: 2025-07-16
Payer: MEDICAID

## 2025-07-16 DIAGNOSIS — H25.813 COMBINED FORMS OF AGE-RELATED CATARACT OF BOTH EYES: ICD-10-CM

## 2025-07-16 DIAGNOSIS — H40.013 OPEN ANGLE WITH BORDERLINE FINDINGS, LOW RISK, BILATERAL: ICD-10-CM

## 2025-07-16 DIAGNOSIS — H52.7 REFRACTIVE ERROR: ICD-10-CM

## 2025-07-16 DIAGNOSIS — E11.9 TYPE 2 DIABETES MELLITUS WITHOUT COMPLICATION, WITHOUT LONG-TERM CURRENT USE OF INSULIN: Primary | ICD-10-CM

## 2025-07-16 PROCEDURE — 92015 DETERMINE REFRACTIVE STATE: CPT | Performed by: OPHTHALMOLOGY

## 2025-07-16 PROCEDURE — 92133 CPTRZD OPH DX IMG PST SGM ON: CPT | Performed by: OPHTHALMOLOGY

## 2025-07-16 PROCEDURE — 99214 OFFICE O/P EST MOD 30 MIN: CPT | Performed by: OPHTHALMOLOGY

## 2025-07-16 ASSESSMENT — REFRACTION_MANIFEST
OD_CYLINDER: -2.25
OS_SPHERE: -3.50
OS_SPHERE: -4.25
OD_AXIS: 070
METHOD_AUTOREFRACTION: 1
OS_AXIS: 070
OD_SPHERE: -3.50
OS_CYLINDER: -2.50
OD_CYLINDER: -2.75
OS_AXIS: 080
OD_SPHERE: -2.75
OD_AXIS: 070
OS_ADD: +3.00
OD_ADD: +3.00
OS_CYLINDER: -1.75

## 2025-07-16 ASSESSMENT — REFRACTION_WEARINGRX
OD_CYLINDER: -2.25
OD_SPHERE: -3.75
OS_CYLINDER: -1.75
OS_AXIS: 080
OD_AXIS: 070
OS_SPHERE: -4.75
OD_ADD: +2.75
SPECS_TYPE: PAL
OS_ADD: +2.75

## 2025-07-16 ASSESSMENT — ENCOUNTER SYMPTOMS
PSYCHIATRIC NEGATIVE: 0
RESPIRATORY NEGATIVE: 0
EYES NEGATIVE: 0
GASTROINTESTINAL NEGATIVE: 0
ALLERGIC/IMMUNOLOGIC NEGATIVE: 0
MUSCULOSKELETAL NEGATIVE: 0
ENDOCRINE NEGATIVE: 0
NEUROLOGICAL NEGATIVE: 0
HEMATOLOGIC/LYMPHATIC NEGATIVE: 0
CONSTITUTIONAL NEGATIVE: 0
CARDIOVASCULAR NEGATIVE: 0

## 2025-07-16 ASSESSMENT — TONOMETRY
IOP_METHOD: GOLDMANN APPLANATION
OS_IOP_MMHG: 20
OD_IOP_MMHG: 21

## 2025-07-16 ASSESSMENT — EXTERNAL EXAM - RIGHT EYE: OD_EXAM: NORMAL

## 2025-07-16 ASSESSMENT — PAIN SCALES - GENERAL: PAINLEVEL_OUTOF10: 0-NO PAIN

## 2025-07-16 ASSESSMENT — PACHYMETRY
OS_CT(UM): 595
OD_CT(UM): 592

## 2025-07-16 ASSESSMENT — VISUAL ACUITY
OS_CC: 20/30
OD_CC: 20/25
CORRECTION_TYPE: GLASSES
METHOD: SNELLEN - LINEAR

## 2025-07-16 ASSESSMENT — PATIENT HEALTH QUESTIONNAIRE - PHQ9
2. FEELING DOWN, DEPRESSED OR HOPELESS: NOT AT ALL
1. LITTLE INTEREST OR PLEASURE IN DOING THINGS: NOT AT ALL
SUM OF ALL RESPONSES TO PHQ9 QUESTIONS 1 AND 2: 0

## 2025-07-16 ASSESSMENT — CUP TO DISC RATIO
OD_RATIO: 0.4
OS_RATIO: 0.35

## 2025-07-16 ASSESSMENT — EXTERNAL EXAM - LEFT EYE: OS_EXAM: TRACE BROW PTOSIS

## 2025-07-16 NOTE — ASSESSMENT & PLAN NOTE
New Rx for glasses/SCL given per patient request. Patient's signature obtained to acknowledge and confirm that a paper copy of glasses/SCL Rx was given to patient in compliance with [facility] Eyeglass Rule. Electronic copy of Rx will also be available via [employer]EP[employer].

## 2025-07-16 NOTE — PATIENT INSTRUCTIONS
Thank you so much for choosing me to provide your care today!    If you were dilated your vision may remain blurry   or light sensitive for several hours.    The nature of eye and vision problems can require frequent follow up, please make every effort to adhere to any future appointments.    If you have any issues, questions, or concerns,   please do not hesitate to reach out.    If you receive a survey in regards to your care today, please mention any exceptional care my office staff and/or technicians provided.    You can reach our office at this number:    [phone]    Please consider signing up for and utilizing [employer]!  This is the best way to directly reach me or other  providers

## 2025-07-16 NOTE — LETTER
July 16, 2025    DINO Mayfield    Patient: Fouzia Sanchez   YOB: 1960   Date of Visit: 7/16/2025       Dear DINO Burger:    Thank you for referring Fouzia Sanchez to me for evaluation. Here is my assessment and plan of care:    Assessment/Plan:  Fouzia was seen today for annual exam.  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  Refractive error  Open angle with borderline findings, low risk, bilateral  -     OCT, Optic Nerve - OU - Both Eyes    Right eye:     Left eye:    [x] No diabetes mellitus (DM) retinopathy [x] No diabetes mellitus (DM) retinopathy    [] Mild non-proliferative retinopathy [] Mild non-proliferative retinopathy    [] Moderate non-proliferative retinopathy [] Moderate non-proliferative retinopathy    [] Severe non-proliferative retinopathy [] Severe non-proliferative retinopathy    [] Proliferative retinopathy   [] Proliferative retinopathy    [] Diabetic macular edema   [] Diabetic macular edema    Urged patient to continue to work on best blood sugar and blood pressure control  Advised patient to call if any new vision changes noted    Will plan to repeat evaluation in:   [] 3 months [] 6 months [] 9 months [x] 12 months [] Other    Below you can find relevant pieces of the exam. If you have questions, please do not hesitate to call me. I look forward to following Fouzia along with you.         Sincerely,        Bryce Carias MD        CC:   No Recipients         External Exam         Right Left    External Normal trace Brow ptosis              Slit Lamp Exam         Right Left    Lids/Lashes 1+ Dermatochalasis - upper lid, 1+ Blepharitis 1+ Dermatochalasis - upper lid, 1+ Blepharitis    Conjunctiva/Sclera White and quiet White and quiet    Cornea Clear Clear    Anterior Chamber Deep and quiet  "Deep and quiet    Iris Round and reactive Round and reactive    Lens 1+ Nuclear sclerosis, Trace Cortical cataract 1+ Nuclear sclerosis, Trace Cortical cataract              Fundus Exam         Right Left    Vitreous vitreous syneresis vitreous syneresis    Disc Tilted disc, Peripapillary atrophy Tilted disc, Peripapillary atrophy    C/D Ratio 0.4 0.35    Macula Normal Normal    Vessels Normal Normal    Periphery Normal Normal                   <div id=\"[email or web address]\"></div>     "

## 2025-07-16 NOTE — PROGRESS NOTES
Assessment/Plan   Problem List Items Addressed This Visit       Diabetes mellitus (Multi)    Advised no signs of diabetic changes on exam. Recommend to continue best sugar control and on need for annual checkup. Understands role of good BP control and weight loss if applicable. Discussed some components of selected studies like WESDR, DCCT, and UKPDS to describe the likely course of diabetes and effects on the eyes.           Combined forms of age-related cataract of both eyes    Non significant cataract noted on exam. Discussed the natural course of cataract and may require surgery at some point in the future. Will plan to continue to monitor with serial exam.            Open angle with borderline findings, low risk, bilateral - Primary    Suspect only both eyes (OU), stable OCT nerve over time and likely physiologic with myopia. Will monitor with alternating fields and OCT.          Refractive error    New Rx for glasses/SCL given per patient request. Patient's signature obtained to acknowledge and confirm that a paper copy of glasses/SCL Rx was given to patient in compliance with [facility] Eyeglass Rule. Electronic copy of Rx will also be available via [employer].               Provided reassurance regarding above diagnoses and care received in the office visit today. Discussed outcomes and options along with the importance of treatment compliance. Understands the importance of any follow up visits. Patient instructed to call/communicate with our office if any new issues, questions, or concerns.     Will plan to see back in 1 year full [place] visual field (HVF) 24-2 or sooner PRN

## 2025-07-16 NOTE — ASSESSMENT & PLAN NOTE
Suspect only both eyes (OU), stable OCT nerve over time and likely physiologic with myopia. Will monitor with alternating fields and OCT.

## 2025-07-18 LAB
25(OH)D3+25(OH)D2 SERPL-MCNC: 79 NG/ML (ref 30–100)
ALBUMIN SERPL-MCNC: 4.5 G/DL (ref 3.6–5.1)
ALBUMIN/CREAT UR: 391 MG/G CREAT
ALP SERPL-CCNC: 53 U/L (ref 37–153)
ALT SERPL-CCNC: 26 U/L (ref 6–29)
ANION GAP SERPL CALCULATED.4IONS-SCNC: 11 MMOL/L (CALC) (ref 7–17)
APPEARANCE UR: CLEAR
AST SERPL-CCNC: 24 U/L (ref 10–35)
BACTERIA #/AREA URNS HPF: ABNORMAL /HPF
BACTERIA UR CULT: ABNORMAL
BASOPHILS # BLD AUTO: 42 CELLS/UL (ref 0–200)
BASOPHILS NFR BLD AUTO: 0.5 %
BILIRUB SERPL-MCNC: 0.2 MG/DL (ref 0.2–1.2)
BILIRUB UR QL STRIP: NEGATIVE
BUN SERPL-MCNC: 13 MG/DL (ref 7–25)
C1Q SERPL-MCNC: 6.3 MG/DL (ref 5–8.6)
C3 SERPL-MCNC: 180 MG/DL (ref 83–193)
C4 SERPL-MCNC: 39 MG/DL (ref 15–57)
CALCIUM SERPL-MCNC: 9.9 MG/DL (ref 8.6–10.4)
CHLORIDE SERPL-SCNC: 106 MMOL/L (ref 98–110)
CK SERPL-CCNC: 106 U/L (ref 20–243)
CO2 SERPL-SCNC: 24 MMOL/L (ref 20–32)
COLOR UR: YELLOW
CREAT SERPL-MCNC: 0.66 MG/DL (ref 0.5–1.05)
CREAT UR-MCNC: 57 MG/DL (ref 20–275)
CRP SERPL-MCNC: <3 MG/L
DSDNA AB SER-ACNC: 1 IU/ML
EGFRCR SERPLBLD CKD-EPI 2021: 98 ML/MIN/1.73M2
EOSINOPHIL # BLD AUTO: 216 CELLS/UL (ref 15–500)
EOSINOPHIL NFR BLD AUTO: 2.6 %
ERYTHROCYTE [DISTWIDTH] IN BLOOD BY AUTOMATED COUNT: 14.4 % (ref 11–15)
ERYTHROCYTE [SEDIMENTATION RATE] IN BLOOD BY WESTERGREN METHOD: 2 MM/H
FERRITIN SERPL-MCNC: 23 NG/ML (ref 16–288)
GLUCOSE SERPL-MCNC: 107 MG/DL (ref 65–139)
GLUCOSE UR QL STRIP: ABNORMAL
HCT VFR BLD AUTO: 40.8 % (ref 35–45)
HGB BLD-MCNC: 12.8 G/DL (ref 11.7–15.5)
HGB UR QL STRIP: NEGATIVE
HYALINE CASTS #/AREA URNS LPF: ABNORMAL /LPF
IL6 SERPL-MCNC: <1.4 PG/ML
IRON SATN MFR SERPL: 17 % (CALC) (ref 16–45)
IRON SERPL-MCNC: 74 MCG/DL (ref 45–160)
KETONES UR QL STRIP: NEGATIVE
LEUKOCYTE ESTERASE UR QL STRIP: NEGATIVE
LYMPHOCYTES # BLD AUTO: 3270 CELLS/UL (ref 850–3900)
LYMPHOCYTES NFR BLD AUTO: 39.4 %
MCH RBC QN AUTO: 27.6 PG (ref 27–33)
MCHC RBC AUTO-ENTMCNC: 31.4 G/DL (ref 32–36)
MCV RBC AUTO: 88.1 FL (ref 80–100)
MICROALBUMIN UR-MCNC: 22.3 MG/DL
MONOCYTES # BLD AUTO: 564 CELLS/UL (ref 200–950)
MONOCYTES NFR BLD AUTO: 6.8 %
NEUTROPHILS # BLD AUTO: 4208 CELLS/UL (ref 1500–7800)
NEUTROPHILS NFR BLD AUTO: 50.7 %
NITRITE UR QL STRIP: NEGATIVE
PH UR STRIP: 5.5 [PH] (ref 5–8)
PLATELET # BLD AUTO: 367 THOUSAND/UL (ref 140–400)
PMV BLD REES-ECKER: 10.2 FL (ref 7.5–12.5)
POTASSIUM SERPL-SCNC: 4.2 MMOL/L (ref 3.5–5.3)
PROT SERPL-MCNC: 7.4 G/DL (ref 6.1–8.1)
PROT UR QL STRIP: ABNORMAL
RBC # BLD AUTO: 4.63 MILLION/UL (ref 3.8–5.1)
RBC #/AREA URNS HPF: ABNORMAL /HPF
SERVICE CMNT-IMP: ABNORMAL
SODIUM SERPL-SCNC: 141 MMOL/L (ref 135–146)
SP GR UR STRIP: 1.02 (ref 1–1.03)
SQUAMOUS #/AREA URNS HPF: ABNORMAL /HPF
TIBC SERPL-MCNC: 430 MCG/DL (CALC) (ref 250–450)
URATE SERPL-MCNC: 5.1 MG/DL (ref 2.5–7)
VIT B12 SERPL-MCNC: >2000 PG/ML (ref 200–1100)
WBC # BLD AUTO: 8.3 THOUSAND/UL (ref 3.8–10.8)
WBC #/AREA URNS HPF: ABNORMAL /HPF

## 2025-07-25 ENCOUNTER — OFFICE VISIT (OUTPATIENT)
Facility: CLINIC | Age: 65
End: 2025-07-25
Payer: MEDICAID

## 2025-07-25 VITALS
WEIGHT: 187 LBS | HEIGHT: 65 IN | DIASTOLIC BLOOD PRESSURE: 77 MMHG | HEART RATE: 85 BPM | BODY MASS INDEX: 31.16 KG/M2 | SYSTOLIC BLOOD PRESSURE: 165 MMHG

## 2025-07-25 DIAGNOSIS — M10.9 GOUT WITH MANIFESTATIONS: ICD-10-CM

## 2025-07-25 DIAGNOSIS — M05.79 SEROPOSITIVE RHEUMATOID ARTHRITIS OF MULTIPLE JOINTS (MULTI): ICD-10-CM

## 2025-07-25 PROCEDURE — 3078F DIAST BP <80 MM HG: CPT | Performed by: INTERNAL MEDICINE

## 2025-07-25 PROCEDURE — 99214 OFFICE O/P EST MOD 30 MIN: CPT | Performed by: INTERNAL MEDICINE

## 2025-07-25 PROCEDURE — 4010F ACE/ARB THERAPY RXD/TAKEN: CPT | Performed by: INTERNAL MEDICINE

## 2025-07-25 PROCEDURE — 3008F BODY MASS INDEX DOCD: CPT | Performed by: INTERNAL MEDICINE

## 2025-07-25 PROCEDURE — 3077F SYST BP >= 140 MM HG: CPT | Performed by: INTERNAL MEDICINE

## 2025-07-25 RX ORDER — METHOTREXATE 2.5 MG/1
20 TABLET ORAL WEEKLY
Qty: 32 TABLET | Refills: 11 | Status: SHIPPED | OUTPATIENT
Start: 2025-07-25

## 2025-07-25 ASSESSMENT — ROUTINE ASSESSMENT OF PATIENT INDEX DATA (RAPID3)
TOTAL RAPID3 SCORE: 6.7
WALK_FLAT_GROUND: WITHOUT ANY DIFFICULTY
ON A SCALE OF ONE TO TEN, CONSIDERING ALL THE WAYS IN WHICH ILLNESS AND HEALTH CONDITIONS MAY AFFECT YOU AT THIS TIME, PLEASE INDICATE BELOW HOW YOU ARE DOING:: 3
SEVERITY_SCORE: 0
WALK_KILOMETERS: WITHOUT ANY DIFFICULTY
TURN_FAUCETS_OFF: WITHOUT ANY DIFFICULTY
ON A SCALE OF ONE TO TEN, HOW MUCH PAIN HAVE YOU HAD BECAUSE OF YOUR CONDITION OVER THE PAST WEEK?: 3
PARTIPATE_RECREATIONAL_ACTIVITIES: WITH MUCH DIFFICULTY
PICK_CLOTHES_OFF_FLOOR: WITHOUT ANY DIFFICULTY
SUM OF QUESTIONS A TO J: 2
WEIGHTED_TOTAL_SCORE: 2.23
FEELINGS_DEPRESSION: WITH SOME DIFFICULTY
FN_SCORE: 0.7
IN_OUT_TRANSPORT: WITHOUT ANY DIFFICULTY
SEVERITY_SCORE: MODERATE SEVERITY (MS)
GOOD_NIGHTS_SLEEP: WITH SOME DIFFICULTY
ON A SCALE OF ONE TO TEN, HOW MUCH PAIN HAVE YOU HAD BECAUSE OF YOUR CONDITION OVER THE PAST WEEK?: 3
FEELINGS_ANXIETY_NERVOUS: WITH SOME DIFFICULTY
DRESS_YOURSELF: WITHOUT ANY DIFFICULTY
ON A SCALE OF ONE TO TEN, CONSIDERING ALL THE WAYS IN WHICH ILLNESS AND HEALTH CONDITIONS MAY AFFECT YOU AT THIS TIME, PLEASE INDICATE BELOW HOW YOU ARE DOING:: 3
IN_OUT_BED: WITHOUT ANY DIFFICULTY
LIFT_CUP_TO_MOUTH: WITHOUT ANY DIFFICULTY
WASH_DRY_BODY: WITHOUT ANY DIFFICULTY

## 2025-07-25 ASSESSMENT — COLUMBIA-SUICIDE SEVERITY RATING SCALE - C-SSRS
6. HAVE YOU EVER DONE ANYTHING, STARTED TO DO ANYTHING, OR PREPARED TO DO ANYTHING TO END YOUR LIFE?: NO
2. HAVE YOU ACTUALLY HAD ANY THOUGHTS OF KILLING YOURSELF?: NO
1. IN THE PAST MONTH, HAVE YOU WISHED YOU WERE DEAD OR WISHED YOU COULD GO TO SLEEP AND NOT WAKE UP?: NO

## 2025-07-25 ASSESSMENT — PATIENT HEALTH QUESTIONNAIRE - PHQ9
1. LITTLE INTEREST OR PLEASURE IN DOING THINGS: NOT AT ALL
2. FEELING DOWN, DEPRESSED OR HOPELESS: NOT AT ALL
SUM OF ALL RESPONSES TO PHQ9 QUESTIONS 1 AND 2: 0

## 2025-07-25 ASSESSMENT — PAIN SCALES - GENERAL: PAINLEVEL_OUTOF10: 2

## 2025-07-25 NOTE — PROGRESS NOTES
[facility]/  Rheumatology  [address], [address]  [place] [address]  Phone: [phone]  Fax: [phone]    Rheumatology Follow Up Visit 07/25/2025        Fouzia Sanchez is a 65 y.o. female here for follow up.     Last Visit: 3/26/2025      HPI    Workup including XR and d/w pt. Soft tissue swelling, periarticular osteopenia, chronic achilles enthesopathy, no erosions. Elevated WBC, PLT, CRP. High Vectra, anti CCP and Anti TPO.  Chief complaint: joint swelling and pain  Since Feb 1, 2023  Sudden onset  Intermittent course  Precipitating factors: slight aching  Associated symptoms: swelling, stiffness, pain  Better: heat  Worse: cold  Occupation:   ROS+   Fatigue  Hair loss  Swelling of feet only when in pain  Abd pain/GERD  AM stiffness, sometimes all day  Joint pain, swelling  Muscle aches and weakness- L leg  Rashes- Psoriasis  Seasonal allergies  Frequent infections as a childp- strep  Pregnancy loss in 1st trimester and 1 stillborn  Numbness/tingling tall finger R hand  Thyroid dz- Hashimoto's  Diabetes  Post menopause  Depression/ Anxiety  Sleep disturbance    Currently tenderness of bilateral shoulders, L elbow, R hip, R 2nd MCP  Swelling of L elbow, soy ankles, R 2nd MCP  AM stiffness sometimes all day, sometimes 2 hr or less.  Patient went to urgent care and dx with gout and tx with colchine. Rheumatology panel done and found to have + JONNY, + SSB, and + RF.  Psoriasis since lost her baby.       FHx: Mother- Crohn's, psoriasis  Brother- Psoriasis, on Humira    Previous Tx  Naproxen- somewhat helpful  Ibuprofen- somewhat helpful  Aspirin- very helpful but takes 4 tablets 4 times a dy for flares  Steroids- very helpful  Colchicine- very helpful  Clobetasol and other creams- no significant help  Betamethasone- some help    Health Maintenance:  DXA- T-0.8 (hip; 12/23); FRAX 7.3%/0.4%  Malignancy Hx- none  Immunization History   Administered Date(s) Administered     Flu vaccine (IIV4), preservative free *Check age/dose* 2016, 2020    Flu vaccine, quadrivalent, no egg protein, age 6 month or greater (FLUCELVAX) 2023    Flu vaccine, trivalent, preservative free, age 6 months and greater (Fluarix/Fluzone/Flulaval) 2020, 10/26/2021, 10/19/2022    Influenza Whole 10/22/2018    Moderna SARS-CoV-2 Vaccination 2021, 2021    Pneumococcal polysaccharide vaccine, 23-valent, age 2 years and older (PNEUMOVAX 23) 10/26/2021    Tdap vaccine, age 7 year and older (BOOSTRIX, ADACEL) 2022       Past Medical History:   Diagnosis Date    Anemia     does not have    CAD (coronary artery disease)     Chronic tension headache     resolved 2020    COVID         Depression     Diabetes mellitus (Multi)     non insulin depndent    Hashimoto's disease     Hepatic steatosis     Hypercalcemia     resolved 2020    Hyperlipidemia     Hypertension     Hypertensive retinopathy     with preglaucoma- Dr Zhao    Iron deficiency anemia     does not have    Psoriasis     Thoracic compression fracture (Multi)     T11 from traumatic fall down stairs    Uterine fibroid       Past Surgical History:   Procedure Laterality Date     SECTION, CLASSIC      OOPHORECTOMY Right     OVARIAN CYST REMOVAL Right     SHOULDER OPEN ROTATOR CUFF REPAIR Left       Current Outpatient Medications   Medication Sig Dispense Refill    amLODIPine (Norvasc) 10 mg tablet Take 1 tablet (10 mg) by mouth once daily.      B complex-vitamin C-folic acid (Nephro-Jael Rx) 1- mg-mg-mcg tablet Take 1 tablet by mouth once daily with breakfast.      cholecalciferol (Vitamin D-3) 50 MCG ( UT) tablet 1 capsule Orally      clobetasol (Temovate) 0.05 % ointment APPLY TOPICALLY TO THE AFFECTED AREA TWICE DAILY      cyanocobalamin (Vitamin B-12) 1,000 mcg tablet Take 1 tablet (1,000 mcg) by mouth once daily.      dapagliflozin propanediol (Farxiga) 10 mg Take 1 tablet (10 mg)  by mouth once daily.      diclofenac sodium (Voltaren) 1 % gel Apply 4.5 inches (4 g) topically 4 times a day. 100 g 3    dulaglutide (Trulicity) 0.75 mg/0.5 mL pen injector Inject 0.75 mg into skin once a week 2 mL 2    dulaglutide (Trulicity) 3 mg/0.5 mL injection Inject 3 mg Subcutaneous once a week as directed 2 mL 3    dulaglutide (Trulicity) 3 mg/0.5 mL pen injector Inject 4.5 mg under the skin 1 (one) time per week. Once a week on Tuesday      fenofibrate (Tricor) 145 mg tablet Take 1 tablet (145 mg) by mouth once daily.      ferrous gluconate 256 mg (28 mg iron) tablet Take 1 tablet (28 mg) by mouth 1 (one) time per week. Takes one tab once a week      hydroxychloroquine (Plaquenil) 200 mg tablet Take 1 tablet (200 mg) by mouth 2 times a day. 180 tablet 3    leucovorin (Wellcovorin) 5 mg tablet Take 1 tablet (5 mg total) by mouth once daily.  Take with a full glass of water every day except the day of methotrexate. 90 tablet 3    levothyroxine (Synthroid) 112 mcg tablet Take 1 tablet (112 mcg) by mouth once daily in the morning. Take before meals. One tablet Monday thru Saturday and one and a half on sunday      losartan (Cozaar) 100 mg tablet TAKE 1 TABLET(100 MG) BY MOUTH EVERY DAY 90 tablet 3    lubricating eye drops ophthalmic solution 1 drop if needed for dry eyes.      metFORMIN  mg 24 hr tablet Take 2 tablets (1,000 mg) by mouth 2 times a day.      metoprolol succinate XL (Toprol-XL) 200 mg 24 hr tablet Take 1 tablet (200 mg) by mouth once daily. Do not crush or chew.      multivitamin with minerals tablet Take 1 tablet by mouth once daily.      omeprazole OTC (PriLOSEC OTC) 20 mg EC tablet Take 1 tablet (20 mg) by mouth once daily in the morning. Take before meals. Do not crush, chew, or split.      rosuvastatin (Crestor) 10 mg tablet 1 tablet Orally Once a day      traMADol (Ultram) 50 mg tablet TK 1 T PO  BID PRN      traZODone (Desyrel) 50 mg tablet 1 tablet at bedtime Orally Once a day       "Trulicity 4.5 mg/0.5 mL pen injector USE AS DIRECTED SUBCUTAENOUS ONCE A WEEK FOR 30 DAYS      venlafaxine XR (Effexor-XR) 37.5 mg 24 hr capsule TAKE 1 CAPSULE BY MOUTH EVERY DAY WITH FOOD      methotrexate (Trexall) 2.5 mg tablet Take 8 tablets (20 mg total) by mouth 1 (one) time per week.  Follow directions carefully, and ask to explain any part you do not understand. Take exactly as directed. 32 tablet 11     No current facility-administered medications for this visit.      Allergies   Allergen Reactions    Acetaminophen-Codeine Unknown    Etomidate Other and Unknown    Hydrocodone Nausea/vomiting and Unknown    Hydrocodone-Acetaminophen Nausea/vomiting    Percocet [Oxycodone-Acetaminophen] Unknown    Lovastatin Unknown    Naproxen Other and Unknown     Stated did not work for her wrist pain      Visit Vitals  /77 (BP Location: Left arm, Patient Position: Sitting, BP Cuff Size: Adult long)   Pulse 85   Ht 1.651 m (5' 5\")   Wt 84.8 kg (187 lb)   BMI 31.12 kg/m²   OB Status Postmenopausal   Smoking Status Former   BSA 1.97 m²         Rapid 3  Function Score (FN): 0.7  Pain Score (PN) (0-10): 3  Patient Global (PTGL) (0-10): 3  Rapid3 Score: 6.7  RAPID3 Weighted Score: 2.23  Workup     Component      Latest Ref [facility] 7/14/2025   WHITE BLOOD CELL COUNT      3.8 - 10.8 Thousand/uL 8.3    RED BLOOD CELL COUNT      3.80 - 5.10 Million/uL 4.63    HEMOGLOBIN      11.7 - 15.5 g/dL 12.8    HEMATOCRIT      35.0 - 45.0 % 40.8    MCV      80.0 - 100.0 fL 88.1    MCH      27.0 - 33.0 pg 27.6    MCHC      32.0 - 36.0 g/dL 31.4 (L)    RDW      11.0 - 15.0 % 14.4    PLATELET COUNT      140 - 400 Thousand/uL 367    MPV      7.5 - 12.5 fL 10.2    ABSOLUTE NEUTROPHILS      1,500 - 7,800 cells/uL 4,208    ABSOLUTE LYMPHOCYTES      850 - 3,900 cells/uL 3,270    ABSOLUTE MONOCYTES      200 - 950 cells/uL 564    ABSOLUTE EOSINOPHILS      15 - 500 cells/uL 216    ABSOLUTE BASOPHILS      0 - 200 cells/uL 42    NEUTROPHILS      % 50.7  "   LYMPHOCYTES      % 39.4    MONOCYTES      % 6.8    EOSINOPHILS      % 2.6    BASOPHILS      % 0.5    COLOR      YELLOW  YELLOW    APPEARANCE      CLEAR  CLEAR    SPECIFIC GRAVITY      1.001 - 1.035  1.023    PH      5.0 - 8.0  5.5    GLUCOSE      NEGATIVE  3+ !    BILIRUBIN      NEGATIVE  NEGATIVE    KETONES      NEGATIVE  NEGATIVE    OCCULT BLOOD      NEGATIVE  NEGATIVE    PROTEIN      NEGATIVE  1+ !    NITRITE      NEGATIVE  NEGATIVE    LEUKOCYTE ESTERASE      NEGATIVE  NEGATIVE    WBC      < OR = 5 /HPF 0-5    RBC      < OR = 2 /HPF NONE SEEN    SQUAMOUS EPITHELIAL CELLS      < OR = 5 /HPF NONE SEEN    BACTERIA      NONE SEEN /HPF NONE SEEN    HYALINE CAST      NONE SEEN /LPF NONE SEEN    NOTE -    GLUCOSE      65 - 139 mg/dL 107    UREA NITROGEN (BUN)      7 - 25 mg/dL 13    CREATININE      0.50 - 1.05 mg/dL 0.66    EGFR      > OR = 60 mL/min/1.73m2 98    SODIUM      135 - 146 mmol/L 141    POTASSIUM      3.5 - 5.3 mmol/L 4.2    CHLORIDE      98 - 110 mmol/L 106    CARBON DIOXIDE      20 - 32 mmol/L 24    ELECTROLYTE BALANCE      7 - 17 mmol/L (calc) 11    CALCIUM      8.6 - 10.4 mg/dL 9.9    PROTEIN, TOTAL      6.1 - 8.1 g/dL 7.4    ALBUMIN      3.6 - 5.1 g/dL 4.5    BILIRUBIN, TOTAL      0.2 - 1.2 mg/dL 0.2    ALKALINE PHOSPHATASE      37 - 153 U/L 53    AST      10 - 35 U/L 24    ALT      6 - 29 U/L 26    CREATININE, RANDOM URINE      20 - 275 mg/dL 57    ALBUMIN, URINE      See Note: mg/dL 22.3    ALBUMIN/CREATININE RATIO, RANDOM URINE      <30 mg/g creat 391 (H)    IRON, TOTAL      45 - 160 mcg/dL 74    IRON BINDING CAPACITY      250 - 450 mcg/dL (calc) 430    % SATURATION      16 - 45 % (calc) 17    DNA (DS) ANTIBODY      IU/mL 1    COMPLEMENT COMPONENT C1Q      5.0 - 8.6 mg/dL 6.3    COMPLEMENT COMPONENT C3C      83 - 193 mg/dL 180    COMPLEMENT COMPONENT C4C      15 - 57 mg/dL 39    C-REACTIVE PROTEIN      <8.0 mg/L <3.0    CREATINE KINASE, TOTAL      20 - 243 U/L 106    SED RATE BY MODIFIED  WESTERGREN      < OR = 30 mm/h 2    VITAMIN D,25-OH,TOTAL,IA      30 - 100 ng/mL 79    VITAMIN B12      200 - 1100 pg/mL >2000 (H)    FERRITIN      16 - 288 ng/mL 23    INTERLEUKIN 6 (IL 6), SERUM      <5.00 pg/mL <1.40    URIC ACID      2.5 - 7.0 mg/dL 5.1            Rapid 3  Function Score (FN): 0.7  Pain Score (PN) (0-10): 3  Patient Global (PTGL) (0-10): 3  Rapid3 Score: 6.7  RAPID3 Weighted Score: 2.23        1. Gout with manifestations        2. Seropositive rheumatoid arthritis of multiple joints (Multi)  methotrexate (Trexall) 2.5 mg tablet                Previously, adherent and tolerating HCQ, methotrexate 12.5 mg q wk  Mechanical fall  Abscess tooth, tx with Zpack, then extracted tooth. Told is all clear of infection.   Denies any recent or current infection.  Not on any NSAIDs or glucocorticoids.  ROS+ for night sweats without fever, maybe scalp tenderness, dey eyes- on eye drops, diarrhea and lower abdomen issues with iron, rashes, joint pain L hip R knee L ring finger locks   AM stiffness a couple of hours, weakness shoulders, tingling hands that wakes her up, depression.   Rapid 3 consistent with at/near remission.  Labs reviewed  D/w pt tx options  Continue regimen  Chair yoga  Advised of possible side effects and importance of monitoring.   All questions answered.  Patient to follow up with primary care provider regarding all other medical issues not addressed today and for medical chart updating.         Previously, adherent and tolerating  mg daily, methotrexate 12.5 mg q wk.  On iron with vit C 3/wk.  Psoriasis much better than originally with tx but still with some small multiple patches legs and large patch over elbows.  Not hurting all day or swollen all they time like before,   Past couple of weeks swelling and pain including feet, L hip.  L finger triggering  Feels like muscles contracted/tight at night.  AM stiffness maybe a couple of hours  Denies any recent or current  infection.  Low grade L temporal sens H/A temple for the past coule of weeks without   Not on any NSAIDs or glucocorticoids.  ROS+ for drenching night sweats without fever, fatigue, sicca, BARBOZA, rashes, joint pain/swelling/stiffness, back pain, numbness/tingling when sleeping, depression.  Rapid 3 consistent with moderate severity.  Labs reviewed  D/w pt tx options and decided on   Increase methotrexate   Iron suppl  Advised of possible side effects and importance of monitoring.   All questions answered.  Patient to follow up with primary care provider regarding all other medical issues not addressed today and for medical chart updating.         Since last appt, adherent and tolerating methotrexate 17.5 mg weekly- psoriasis much better but not gone.  Some spots on elbows  Legs pretty clear  AM stiffness about 1 hr, not intense  No triggering- heat helps with trigger finger and pains as well  Random R lat swelling mild   Moving well   A little papin of wrists  Topical diclofenac  Ibuprofen mostly for shoulder - prn, about once a week on average  From 3 to 5 range  Colchince prn- rare- helped with flare of thumb pain and swelling.  Sharp pain R shoulder with range of motion like grabbing  Got 2 spica splints   Denies any recent or current infection.  Not on any NSAIDs or glucocorticoids.  ROS+ for H/A in L front, sicca on ye drops, GERD, abd pain 2sks at night with Bm, joint pain/swelling  Rapid 3 consistent with low to moderate severity.  Labs reviewed  D/w pt tx options and decided on   Increase methotrexate to 20 mg q wk  Hold B12  Iron every other day  Katerina Odom- brother with severe plaque psoriasis is doing well on it   Advised of possible side effects and importance of monitoring.   All questions answered.  Patient to follow up with primary care provider regarding all other medical issues not addressed today and for medical chart updating.       Mary Bee MD      Patient Care Team:  Elsie  ANGEL José-CNP as PCP - General (Family Medicine)  Mary Bee MD as Consulting Physician (Rheumatology)

## 2025-08-15 ENCOUNTER — PHARMACY VISIT (OUTPATIENT)
Dept: PHARMACY | Facility: CLINIC | Age: 65
End: 2025-08-15
Payer: MEDICAID

## 2025-08-15 PROCEDURE — RXMED WILLOW AMBULATORY MEDICATION CHARGE

## 2025-08-25 PROCEDURE — RXMED WILLOW AMBULATORY MEDICATION CHARGE

## 2025-08-27 ENCOUNTER — PHARMACY VISIT (OUTPATIENT)
Dept: PHARMACY | Facility: CLINIC | Age: 65
End: 2025-08-27
Payer: MEDICAID

## 2026-07-21 ENCOUNTER — APPOINTMENT (OUTPATIENT)
Dept: OPHTHALMOLOGY | Facility: CLINIC | Age: 66
End: 2026-07-21
Payer: MEDICAID